# Patient Record
Sex: MALE | Race: WHITE | Employment: OTHER | URBAN - METROPOLITAN AREA
[De-identification: names, ages, dates, MRNs, and addresses within clinical notes are randomized per-mention and may not be internally consistent; named-entity substitution may affect disease eponyms.]

---

## 2017-03-11 RX ORDER — LISINOPRIL 40 MG/1
TABLET ORAL
Qty: 30 TAB | Refills: 5 | Status: SHIPPED | OUTPATIENT
Start: 2017-03-11 | End: 2017-05-10 | Stop reason: SDUPTHER

## 2017-04-02 RX ORDER — OMEPRAZOLE 20 MG/1
CAPSULE, DELAYED RELEASE ORAL
Qty: 30 CAP | Refills: 11 | Status: SHIPPED | OUTPATIENT
Start: 2017-04-02 | End: 2017-05-10 | Stop reason: SDUPTHER

## 2017-04-09 RX ORDER — ALBUTEROL SULFATE 90 UG/1
AEROSOL, METERED RESPIRATORY (INHALATION)
Qty: 8.5 INHALER | Refills: 3 | Status: SHIPPED | OUTPATIENT
Start: 2017-04-09 | End: 2017-05-22 | Stop reason: SDUPTHER

## 2017-04-19 ENCOUNTER — OFFICE VISIT (OUTPATIENT)
Dept: BEHAVIORAL/MENTAL HEALTH CLINIC | Age: 62
End: 2017-04-19

## 2017-04-19 VITALS
HEIGHT: 68 IN | HEART RATE: 109 BPM | SYSTOLIC BLOOD PRESSURE: 161 MMHG | DIASTOLIC BLOOD PRESSURE: 84 MMHG | WEIGHT: 212 LBS | BODY MASS INDEX: 32.13 KG/M2

## 2017-04-19 DIAGNOSIS — F41.9 ANXIETY DISORDER, UNSPECIFIED TYPE: ICD-10-CM

## 2017-04-19 DIAGNOSIS — F31.70 BIPOLAR DISORDER IN REMISSION (HCC): Primary | ICD-10-CM

## 2017-04-19 RX ORDER — DIVALPROEX SODIUM 250 MG/1
TABLET, DELAYED RELEASE ORAL
Qty: 60 TAB | Refills: 6 | Status: SHIPPED | OUTPATIENT
Start: 2017-04-19 | End: 2017-05-22 | Stop reason: SDUPTHER

## 2017-04-19 RX ORDER — SERTRALINE HYDROCHLORIDE 50 MG/1
TABLET, FILM COATED ORAL
Qty: 30 TAB | Refills: 6 | Status: SHIPPED | OUTPATIENT
Start: 2017-04-19 | End: 2017-05-22 | Stop reason: SDUPTHER

## 2017-04-19 NOTE — MR AVS SNAPSHOT
Visit Information Date & Time Provider Department Dept. Phone Encounter #  
 4/19/2017  4:00 PM Osei Crowley MD UlKatheryn Ortiz  [phone] [id number] Follow-up Instructions Return in about 6 months (around 10/19/2017). Your Appointments 5/10/2017  8:00 AM  
ROUTINE CARE with Rosario Israel MD  
[facility] Appt Note: f/u for asthma, b/p, and cholesterol [address] [facility] [address] [place] Drum [address] [address]  
[place] [address] [address] Upcoming Health Maintenance Date Due COLONOSCOPY 8/15/2017 Pneumococcal 19-64 Highest Risk (3 of 3 - PCV13) 11/10/2017 DTaP/Tdap/Td series (2 - Td) 2/8/2021 Allergies as of 4/19/2017  Review Complete On: 4/19/2017 By: Osei Crowley MD  
  
 Severity Noted Reaction Type Reactions Hydrochlorothiazide  04/19/2016    Other (comments)  
 hyponatremia Current Immunizations  Reviewed on 11/10/2016 Name Date Influenza Vaccine 10/10/2016, 9/26/2013 Influenza Vaccine (Quad) PF 10/12/2015, 10/13/2014 Influenza Vaccine Whole 9/26/2012 Pneumococcal Polysaccharide (PPSV-23) 5/18/2015 TDAP Vaccine 2/8/2011 Zoster Vaccine, Live 5/7/2013 Not reviewed this visit You Were Diagnosed With   
  
 Codes Comments Bipolar disorder in remission [facility])    -  Primary ICD-10-CM: F31.70 ICD-9-CM: 296.80 Anxiety disorder, unspecified type     ICD-10-CM: F41.9 ICD-9-CM: 300.00 Vitals BP Pulse Height(growth percentile) Weight(growth percentile) BMI Smoking Status 161/84 (!) 109 5' 8\" (1.727 m) 212 lb (96.2 kg) 32.23 kg/m2 Former Smoker Vitals History BMI and BSA Data Body Mass Index Body Surface Area  
 32.23 kg/m 2 2.15 m 2 Preferred Pharmacy Pharmacy Name Phone [address] [phone] Your Updated Medication List  
  
   
This list is accurate as of: 4/19/17  4:03 PM.  Always use your most recent med list.  
  
  
  
  
 acyclovir 200 mg capsule Commonly known as:  ZOVIRAX Take  by mouth every four (4) hours (while awake). amLODIPine 10 mg tablet Commonly known as:  Mosetta Hark TAKE ONE TABLET BY MOUTH ONE TIME DAILY  
  
 beclomethasone 80 mcg/actuation Aero Commonly known as:  QVAR Take 2 Puffs by inhalation two (2) times a day. divalproex  mg tablet Commonly known as:  DEPAKOTE  
TAKE ONE TABLET BY MOUTH TWICE DAILY  Indications: BIPOLAR DISORDER IN REMISSION  
  
 lisinopril 40 mg tablet Commonly known as:  PRINIVIL, ZESTRIL  
TAKE 1/2 TABLET BY MOUTH TWICE DAILY. loratadine 10 mg tablet Commonly known as:  CLARITIN  
TAKE ONE TABLET BY MOUTH ONE TIME DAILY  
  
 montelukast 10 mg tablet Commonly known as:  SINGULAIR Take 1 Tab by mouth daily. omeprazole 20 mg capsule Commonly known as:  PRILOSEC  
TAKE ONE CAPSULE BY MOUTH ONE TIME DAILY pravastatin 80 mg tablet Commonly known as:  PRAVACHOL  
TAKE ONE TABLET BY MOUTH NIGHTLY AT BEDTIME  
  
 PRED FORTE 1 % ophthalmic suspension Generic drug:  prednisoLONE acetate Administer 1 Drop to both eyes four (4) times daily. PROAIR HFA 90 mcg/actuation inhaler Generic drug:  albuterol INHALE TWO PUFFS BY MOUTH EVERY FOUR HOURS AS NEEDED FOR WHEEZING  
  
 sertraline 50 mg tablet Commonly known as:  ZOLOFT  
TAKE ONE TABLET BY MOUTH ONE TIME DAILY FOR ANXIETY WITH DEPRESSION  
  
 tamsulosin 0.4 mg capsule Commonly known as:  FLOMAX Take 0.4 mg by mouth daily. Prescriptions Sent to Pharmacy Refills  
 divalproex DR (DEPAKOTE) 250 mg tablet 6 Sig: TAKE ONE TABLET BY MOUTH TWICE DAILY  Indications: BIPOLAR DISORDER IN REMISSION Class: Normal  
 Pharmacy: [facility] [place] [address] Ph #: [phone] sertraline (ZOLOFT) 50 mg tablet 6 Sig: TAKE ONE TABLET BY MOUTH ONE TIME DAILY FOR ANXIETY WITH DEPRESSION Class: Normal  
 Pharmacy: [address] #: [phone] Follow-up Instructions Return in about 6 months (around 10/19/2017). Introducing [facility] SERVICES! Dear Valentín España: Thank you for requesting a [employer] account. Our records indicate that you already have an active [employer] account. You can access your account anytime at [email or web address] Did you know that you can access your hospital and ER discharge instructions at any time in [employer]? You can also review all of your test results from your hospital stay or ER visit. Additional Information If you have questions, please visit the Frequently Asked Questions section of the [employer] website at [email or web address] Remember, [employer] is NOT to be used for urgent needs. For medical emergencies, dial 911. Now available from your iPhone and Android! Please provide this summary of care documentation to your next provider. Your primary care clinician is listed as [address] If you have any questions after today's visit, please call [phone].

## 2017-04-20 NOTE — PROGRESS NOTES
Psychiatric Progress Note    Date: 4/19/2017  Account Number:  [id number]  Name: Baron Phipps    Length of psychotherapy session: 15 minutes     Total Patient Care Time Spent: 20 minutes : (Coordinated care:  counseling time with patient, individual psychotherapy with patient; discussions with family members and chart review). SUBJECTIVE:   Baron Phipps  is a 62 y.o.  male  patient presents for a therapy/psychopharmacological management appointment. Pt reports doing well, mood is stable on current meds,  taking meds without any problems or concerns. Patient denies SI/HI/SIB. No evidence of AH/VH or delusions.       Appetite:no change from normal   Sleep: no change     Response to Treatment: Positive   Side Effects: none      Supportive/Cognitive/Reality-Oriented psychotherapy provided in regards to psychosocial stressors:   pre-admission and current problems   Housing issues   Occupational issues   Academic issues   Legal issues   Medical issues   Interpersonal conflicts   Stress of hospitalization  Psychoeducation provided  Treatment plan reviewed with patient-including diagnosis and medications  Worked on issues of denial & effects of substance dependency/use      OBJECTIVE:                 Mental Status exam: WNL except for      Sensorium  oriented to time, place and person   Relations cooperative    Eye Contact    appropriate   Appearance:  age appropriate, casually dressed and within normal Limits   Motor Behavior:  within normal limits   Speech:  normal pitch and normal volume   Thought Process: within normal limits   Thought Content free of delusions and free of hallucinations   Suicidal ideations none   Homicidal ideations none   Mood:  stable   Affect:  stable   Memory recent  adequate   Memory remote:  adequate   Concentration:  adequate   Abstraction:  abstract   Insight:  good   Reliability good   Judgment:  good       Allergies   Allergen Reactions    Hydrochlorothiazide Other (comments) hyponatremia        Current Outpatient Prescriptions   Medication Sig Dispense Refill    divalproex DR (DEPAKOTE) 250 mg tablet TAKE ONE TABLET BY MOUTH TWICE DAILY  Indications: BIPOLAR DISORDER IN REMISSION 60 Tab 6    sertraline (ZOLOFT) 50 mg tablet TAKE ONE TABLET BY MOUTH ONE TIME DAILY FOR ANXIETY WITH DEPRESSION 30 Tab 6    PROAIR HFA 90 mcg/actuation inhaler INHALE TWO PUFFS BY MOUTH EVERY FOUR HOURS AS NEEDED FOR WHEEZING 8.5 Inhaler 3    omeprazole (PRILOSEC) 20 mg capsule TAKE ONE CAPSULE BY MOUTH ONE TIME DAILY 30 Cap 11    lisinopril (PRINIVIL, ZESTRIL) 40 mg tablet TAKE 1/2 TABLET BY MOUTH TWICE DAILY. 30 Tab 5    amLODIPine (NORVASC) 10 mg tablet TAKE ONE TABLET BY MOUTH ONE TIME DAILY 30 Tab 5    pravastatin (PRAVACHOL) 80 mg tablet TAKE ONE TABLET BY MOUTH NIGHTLY AT BEDTIME 30 Tab 5    loratadine (CLARITIN) 10 mg tablet TAKE ONE TABLET BY MOUTH ONE TIME DAILY 30 Tab 11    montelukast (SINGULAIR) 10 mg tablet Take 1 Tab by mouth daily. 30 Tab 5    beclomethasone (QVAR) 80 mcg/actuation inhaler Take 2 Puffs by inhalation two (2) times a day. 8.7 g 5    acyclovir (ZOVIRAX) 200 mg capsule Take  by mouth every four (4) hours (while awake).  prednisoLONE acetate (PRED FORTE) 1 % ophthalmic suspension Administer 1 Drop to both eyes four (4) times daily.  tamsulosin (FLOMAX) 0.4 mg capsule Take 0.4 mg by mouth daily. Medication Changes/Adjustments:   Medications Discontinued During This Encounter   Medication Reason    divalproex DR (DEPAKOTE) 250 mg tablet Reorder    sertraline (ZOLOFT) 50 mg tablet Reorder          ASSESSMENT:  Christian Graff  is a 62 y.o.  male patient presented for his f/u appointment. Pt is stable on current meds. Diagnoses:   Axis I: Bipolar disorder, in remission  Anxiety disorder   Axis II: Deferred   Axis III: HTN, GERD, Borderline DM, left eye cataract   Axis IV:Mild   Axis V: 70-75    RECOMMENDATIONS/PLAN: Continue with the current meds  1. Medications:   Orders Placed This Encounter    divalproex DR (DEPAKOTE) 250 mg tablet    sertraline (ZOLOFT) 50 mg tablet      2. Follow-up Disposition:  Return in about 6 months (around 10/19/2017).

## 2017-05-10 ENCOUNTER — OFFICE VISIT (OUTPATIENT)
Dept: INTERNAL MEDICINE CLINIC | Age: 62
End: 2017-05-10

## 2017-05-10 VITALS
HEIGHT: 68 IN | RESPIRATION RATE: 15 BRPM | HEART RATE: 85 BPM | WEIGHT: 208 LBS | SYSTOLIC BLOOD PRESSURE: 136 MMHG | BODY MASS INDEX: 31.52 KG/M2 | OXYGEN SATURATION: 94 % | TEMPERATURE: 97.9 F | DIASTOLIC BLOOD PRESSURE: 80 MMHG

## 2017-05-10 DIAGNOSIS — E87.5 HYPERKALEMIA: ICD-10-CM

## 2017-05-10 DIAGNOSIS — I10 ESSENTIAL HYPERTENSION WITH GOAL BLOOD PRESSURE LESS THAN 140/90: ICD-10-CM

## 2017-05-10 DIAGNOSIS — F31.32 BIPOLAR DISORDER, CURRENT EPISODE DEPRESSED, MODERATE (HCC): ICD-10-CM

## 2017-05-10 DIAGNOSIS — D64.9 ANEMIA, UNSPECIFIED TYPE: ICD-10-CM

## 2017-05-10 DIAGNOSIS — R73.02 IGT (IMPAIRED GLUCOSE TOLERANCE): ICD-10-CM

## 2017-05-10 DIAGNOSIS — J45.909 UNCOMPLICATED ASTHMA, UNSPECIFIED ASTHMA SEVERITY: Primary | ICD-10-CM

## 2017-05-10 DIAGNOSIS — F41.8 DEPRESSION WITH ANXIETY: ICD-10-CM

## 2017-05-10 DIAGNOSIS — N40.0 BENIGN PROSTATIC HYPERPLASIA, PRESENCE OF LOWER URINARY TRACT SYMPTOMS UNSPECIFIED, UNSPECIFIED MORPHOLOGY: ICD-10-CM

## 2017-05-10 DIAGNOSIS — K21.9 GASTROESOPHAGEAL REFLUX DISEASE WITHOUT ESOPHAGITIS: ICD-10-CM

## 2017-05-10 DIAGNOSIS — T14.8XXA BRUISING: ICD-10-CM

## 2017-05-10 DIAGNOSIS — I10 ESSENTIAL HYPERTENSION: ICD-10-CM

## 2017-05-10 DIAGNOSIS — E78.5 DYSLIPIDEMIA: ICD-10-CM

## 2017-05-10 DIAGNOSIS — K56.699 STRICTURE OF COLON (HCC): ICD-10-CM

## 2017-05-10 LAB — APTT PPP: 30 SEC (ref 24–33)

## 2017-05-10 RX ORDER — TAMSULOSIN HYDROCHLORIDE 0.4 MG/1
0.4 CAPSULE ORAL DAILY
Qty: 90 CAP | Refills: 3 | Status: SHIPPED | OUTPATIENT
Start: 2017-05-10

## 2017-05-10 RX ORDER — AMLODIPINE BESYLATE 10 MG/1
TABLET ORAL
Qty: 90 TAB | Refills: 3 | Status: SHIPPED | OUTPATIENT
Start: 2017-05-10 | End: 2018-05-11 | Stop reason: SDUPTHER

## 2017-05-10 RX ORDER — PRAVASTATIN SODIUM 80 MG/1
TABLET ORAL
Qty: 90 TAB | Refills: 3 | Status: SHIPPED | OUTPATIENT
Start: 2017-05-10 | End: 2018-05-11 | Stop reason: SDUPTHER

## 2017-05-10 RX ORDER — LISINOPRIL 40 MG/1
TABLET ORAL
Qty: 90 TAB | Refills: 3 | Status: SHIPPED | OUTPATIENT
Start: 2017-05-10 | End: 2018-05-11 | Stop reason: SDUPTHER

## 2017-05-10 RX ORDER — OMEPRAZOLE 20 MG/1
CAPSULE, DELAYED RELEASE ORAL
Qty: 90 CAP | Refills: 3 | Status: SHIPPED | OUTPATIENT
Start: 2017-05-10 | End: 2018-05-11 | Stop reason: SDUPTHER

## 2017-05-10 NOTE — PROGRESS NOTES
HISTORY OF PRESENT ILLNESS  Harika Alberts is a 62 y.o. male. HPI  Presents for f/u asthma, HTN, lipids    Pt on claritin + inhaled meds due to pollen counts  Fair to good control. Forearm bruising - increased over the past few years  Many times he does not remember the incident  No bleeding  Evolves and resolves over several days   Pt acknowledges known frequent mild trauma to arms due to his activities    Due for colon screening - due to colon stricture, needs to consider virtual colonoscopy    Past medical, Social, and Family history reviewed  Medications reviewed and updated. ROS  Complete ROS reviewed and negative or stable except as noted in HPI. Physical Exam   Constitutional: He is oriented to person, place, and time. He appears well-nourished. No distress. HENT:   Head: Normocephalic and atraumatic. Mouth/Throat: Oropharynx is clear and moist.   Eyes: EOM are normal. Pupils are equal, round, and reactive to light. No scleral icterus. Neck: Normal range of motion. Neck supple. No JVD present. Cardiovascular: Normal rate, regular rhythm and normal heart sounds. Exam reveals no gallop and no friction rub. No murmur heard. Pulmonary/Chest: Effort normal and breath sounds normal. No respiratory distress. He has no wheezes. He has no rales. Prolonged exp phase   Abdominal: Soft. He exhibits no distension. There is no tenderness. Musculoskeletal: Normal range of motion. He exhibits no edema. Lymphadenopathy:     He has no cervical adenopathy. Neurological: He is alert and oriented to person, place, and time. He exhibits normal muscle tone. Skin: Skin is warm. No rash noted. Psychiatric: He has a normal mood and affect. Nursing note and vitals reviewed. ASSESSMENT and PLAN    ICD-10-CM ICD-9-CM    1. Uncomplicated asthma, unspecified asthma severity J45.909 493.90    2. Stricture of colon (HCC) K56.69 560.9 REFERRAL TO GASTROENTEROLOGY   3.  Depression with anxiety F41.8 300.4 4. Bipolar disorder, current episode depressed, moderate ([facility] [address] F31.32 296.52    5. Essential hypertension I10 401.9    6. Gastroesophageal reflux disease without esophagitis K21.9 530.81    7. Anemia, unspecified type D64.9 285.9 CBC WITH AUTOMATED DIFF   8. IGT (impaired glucose tolerance) R73.02 790.22 HEMOGLOBIN A1C WITH EAG   9. Dyslipidemia E78.5 272.4 LIPID PANEL      METABOLIC PANEL, COMPREHENSIVE   10. Benign prostatic hyperplasia, presence of lower urinary tract symptoms unspecified, unspecified morphology N40.0 600.00    11. Bruising T14.8 924.9 CBC WITH AUTOMATED DIFF      SED RATE (ESR)      C REACTIVE PROTEIN, QT      PROTHROMBIN TIME + INR      PTT   12. Essential hypertension with goal blood pressure less than 140/90 I10 401.9 amLODIPine (NORVASC) 10 mg tablet   13. Hyperkalemia N83.8 706.1 METABOLIC PANEL, BASIC     Follow-up Disposition:  Return in about 6 months (around 11/10/2017), or if symptoms worsen or fail to improve, for asthma, blood pressure.   results and schedule of future studies reviewed with patient  reviewed diet, exercise and weight    cardiovascular risk and specific lipid/LDL goals reviewed  reviewed medications and side effects in detail   Ref to GI  Continue current medications

## 2017-05-10 NOTE — PATIENT INSTRUCTIONS
Learning About Living Dayna Patel  What is a living will? A living will is a legal form you use to write down the kind of care you want at the end of your life. It is used by the health professionals who will treat you if you aren't able to decide for yourself. If you put your wishes in writing, your loved ones and others will know what kind of care you want. They won't need to guess. This can ease your mind and be helpful to others. A living will is not the same as an estate or property will. An estate will explains what you want to happen with your money and property after you die. Is a living will a legal document? A living will is a legal document. Each state has its own laws about living [place]. If you move to another state, make sure that your living will is legal in the state where you now live. Or you might use a universal form that has been approved by many states. This kind of form can sometimes be completed and stored online. Your electronic copy will then be available wherever you have a connection to the Internet. In most cases, doctors will respect your wishes even if you have a form from a different state. · You don't need an  to complete a living will. But legal advice can be helpful if your state's laws are unclear, your health history is complicated, or your family can't agree on what should be in your living will. · You can change your living will at any time. Some people find that their wishes about end-of-life care change as their health changes. · In addition to making a living will, think about completing a medical power of  form. This form lets you name the person you want to make end-of-life treatment decisions for you (your \"health care agent\") if you're not able to. Many hospitals and nursing homes will give you the forms you need to complete a living will and a medical power of .   · Your living will is used only if you can't make or communicate decisions for yourself anymore. If you become able to make decisions again, you can accept or refuse any treatment, no matter what you wrote in your living will. · Your state may offer an online registry. This is a place where you can store your living will online so the doctors and nurses who need to treat you can find it right away. What should you think about when creating a living will? Talk about your end-of-life wishes with your family members and your doctor. Let them know what you want. That way the people making decisions for you won't be surprised by your choices. Think about these questions as you make your living will:  · Do you know enough about life support methods that might be used? If not, talk to your doctor so you know what might be done if you can't breathe on your own, your heart stops, or you're unable to swallow. · What things would you still want to be able to do after you receive life-support methods? Would you want to be able to walk? To speak? To eat on your own? To live without the help of machines? · If you have a choice, where do you want to be cared for? In your home? At a hospital or nursing home? · Do you want certain [employer] practices performed if you become very ill? · If you have a choice at the end of your life, where would you prefer to die? At home? In a hospital or nursing home? Somewhere else? · Would you prefer to be buried or cremated? · Do you want your organs to be donated after you die? What should you do with your living will? · Make sure that your family members and your health care agent have copies of your living will. · Give your doctor a copy of your living will to keep in your medical record. If you have more than one doctor, make sure that each one has a copy. · You may want to put a copy of your living will where it can be easily found. Where can you learn more? Go to http://su-max.info/.   Enter D195 in the search box to learn more about \"Learning About Living Geeta Hernandez. \"  Current as of: February 24, 2016  Content Version: 11.2  © 7382-9967 [employer]. Care instructions adapted under license by [employer] (which disclaims liability or warranty for this information). If you have questions about a medical condition or this instruction, always ask your healthcare professional. [address] any warranty or liability for your use of this information.

## 2017-05-10 NOTE — PROGRESS NOTES
Room 13    Chief Complaint   Patient presents with    Asthma    Blood Pressure Check    Cholesterol Problem   Patient has stopped taking prednisolone drops. Taking Singular during ragweed season only. 1. Have you been to the ER, urgent care clinic since your last visit? Hospitalized since your last visit? No    2. Have you seen or consulted any other health care providers outside of the [address] [place] [address] since your last visit? Include any pap smears or colon screening. No       Health Maintenance Due   Topic Date Due    COLONOSCOPY  08/15/2017   Patient aware colonoscopy is due, states needs to switch gastro. Doctor's.     Information provided regarding living will with AVS.

## 2017-05-10 NOTE — MR AVS SNAPSHOT
Visit Information Date & Time Provider Department Dept. Phone Encounter #  
 5/10/2017  8:00 AM Dimitris Barlow MD [facility] [phone] [id number] Follow-up Instructions Return in about 6 months (around 11/10/2017), or if symptoms worsen or fail to improve, for asthma, blood pressure. Your Appointments 10/19/2017  4:00 PM  
ESTABLISHED PATIENT with Darrick Cranker, MD [address] [place] [address] Appt Note: fu  
 5855 [facility] [address] [place] [address]  
[phone] [address] [place] [address] Upcoming Health Maintenance Date Due COLONOSCOPY 8/15/2017 INFLUENZA AGE 9 TO ADULT 8/1/2017 Pneumococcal 19-64 Highest Risk (3 of 3 - PCV13) 11/10/2017 DTaP/Tdap/Td series (2 - Td) 2/8/2021 Allergies as of 5/10/2017  Review Complete On: 5/10/2017 By: Dimitris Barlow MD  
  
 Severity Noted Reaction Type Reactions Hydrochlorothiazide  04/19/2016    Other (comments)  
 hyponatremia Current Immunizations  Reviewed on 11/10/2016 Name Date Influenza Vaccine 10/10/2016, 9/26/2013 Influenza Vaccine (Quad) PF 10/12/2015, 10/13/2014 Influenza Vaccine Whole 9/26/2012 Pneumococcal Polysaccharide (PPSV-23) 5/18/2015 TDAP Vaccine 2/8/2011 Zoster Vaccine, Live 5/7/2013 Not reviewed this visit You Were Diagnosed With   
  
 Codes Comments Stricture of colon ([facility].)    -  Primary ICD-10-CM: K56.69 
ICD-9-CM: 560.9 Depression with anxiety     ICD-10-CM: F41.8 ICD-9-CM: 300.4 Uncomplicated asthma, unspecified asthma severity     ICD-10-CM: J45.909 ICD-9-CM: 493.90 Bipolar disorder, current episode depressed, moderate ([facility]     ICD-10-CM: F31.32 
ICD-9-CM: 296.52 Essential hypertension     ICD-10-CM: I10 
ICD-9-CM: 401.9 Gastroesophageal reflux disease without esophagitis     ICD-10-CM: K21.9 ICD-9-CM: 530.81   
 Anemia, unspecified type     ICD-10-CM: D64.9 ICD-9-CM: 285.9 IGT (impaired glucose tolerance)     ICD-10-CM: R73.02 
ICD-9-CM: 790.22 Dyslipidemia     ICD-10-CM: E78.5 ICD-9-CM: 272.4 Benign prostatic hyperplasia, presence of lower urinary tract symptoms unspecified, unspecified morphology     ICD-10-CM: N40.0 ICD-9-CM: 600.00 Bruising     ICD-10-CM: T14.8 ICD-9-CM: 924.9 Essential hypertension with goal blood pressure less than 140/90     ICD-10-CM: I10 
ICD-9-CM: 401.9 Vitals BP Pulse Temp Resp Height(growth percentile) Weight(growth percentile) 136/80 (BP 1 Location: Left arm, BP Patient Position: Sitting) 85 97.9 °F (36.6 °C) (Oral) 15 5' 8\" (1.727 m) 208 lb (94.3 kg) SpO2 BMI Smoking Status 94% 31.63 kg/m2 Former Smoker BMI and BSA Data Body Mass Index Body Surface Area  
 31.63 kg/m 2 2.13 m 2 Preferred Pharmacy Pharmacy Name Phone [address], [facility] [phone] Your Updated Medication List  
  
   
This list is accurate as of: 5/10/17  8:48 AM.  Always use your most recent med list.  
  
  
  
  
 acyclovir 200 mg capsule Commonly known as:  ZOVIRAX Take  by mouth every four (4) hours (while awake). amLODIPine 10 mg tablet Commonly known as:  Briana Punter TAKE ONE TABLET BY MOUTH ONE TIME DAILY  
  
 beclomethasone 80 mcg/actuation Aero Commonly known as:  QVAR Take 2 Puffs by inhalation two (2) times a day. divalproex  mg tablet Commonly known as:  DEPAKOTE  
TAKE ONE TABLET BY MOUTH TWICE DAILY  Indications: BIPOLAR DISORDER IN REMISSION  
  
 lisinopril 40 mg tablet Commonly known as:  PRINIVIL, ZESTRIL  
TAKE 1/2 TABLET BY MOUTH TWICE DAILY. loratadine 10 mg tablet Commonly known as:  CLARITIN  
TAKE ONE TABLET BY MOUTH ONE TIME DAILY  
  
 montelukast 10 mg tablet Commonly known as:  SINGULAIR Take 1 Tab by mouth daily. omeprazole 20 mg capsule Commonly known as:  PRILOSEC  
TAKE ONE CAPSULE BY MOUTH ONE TIME DAILY pravastatin 80 mg tablet Commonly known as:  PRAVACHOL  
TAKE ONE TABLET BY MOUTH NIGHTLY AT BEDTIME  
  
 PROAIR HFA 90 mcg/actuation inhaler Generic drug:  albuterol INHALE TWO PUFFS BY MOUTH EVERY FOUR HOURS AS NEEDED FOR WHEEZING  
  
 sertraline 50 mg tablet Commonly known as:  ZOLOFT  
TAKE ONE TABLET BY MOUTH ONE TIME DAILY FOR ANXIETY WITH DEPRESSION  
  
 tamsulosin 0.4 mg capsule Commonly known as:  FLOMAX Take 1 Cap by mouth daily. Prescriptions Sent to Pharmacy Refills  
 pravastatin (PRAVACHOL) 80 mg tablet 3 Sig: TAKE ONE TABLET BY MOUTH NIGHTLY AT BEDTIME Class: Normal  
 Pharmacy: [address], [address] Ph #: [phone]  
 lisinopril (PRINIVIL, ZESTRIL) 40 mg tablet 3 Sig: TAKE 1/2 TABLET BY MOUTH TWICE DAILY. Class: Normal  
 Pharmacy: [address], [address] Ph #: [phone]  
 omeprazole (PRILOSEC) 20 mg capsule 3 Sig: TAKE ONE CAPSULE BY MOUTH ONE TIME DAILY Class: Normal  
 Pharmacy: [address], [address] Ph #: [phone]  
 amLODIPine (NORVASC) 10 mg tablet 3 Sig: TAKE ONE TABLET BY MOUTH ONE TIME DAILY Class: Normal  
 Pharmacy: [address], [address] Ph #: [phone]  
 tamsulosin (FLOMAX) 0.4 mg capsule 3 Sig: Take 1 Cap by mouth daily. Class: Normal  
 Pharmacy: [address], [address] Ph #: [phone] Route: Oral  
  
We Performed the Following C REACTIVE PROTEIN, QT [94010 CPT(R)] CBC WITH AUTOMATED DIFF [22089 CPT(R)] HEMOGLOBIN A1C WITH EAG [08385 CPT(R)] LIPID PANEL [14886 CPT(R)] METABOLIC PANEL, COMPREHENSIVE [42244 CPT(R)] PROTHROMBIN TIME + INR [00723 CPT(R)] PTT [id number] CPT(R)] REFERRAL TO GASTROENTEROLOGY [UWN68 Custom] Comments:  
 Colon imaging due to sigmoid stricture SED RATE (ESR) [id number] CPT(R)] Follow-up Instructions Return in about 6 months (around 11/10/2017), or if symptoms worsen or fail to improve, for asthma, blood pressure. Referral Information Referral ID Referred By Referred To  
  
 [id number] [place], [address]   
   [address] 706 [facility], [address] Visits Status Start Date End Date 1 New Request 5/10/17 5/10/18 If your referral has a status of pending review or denied, additional information will be sent to support the outcome of this decision. Patient Instructions Eula James 1721 What is a living will? A living will is a legal form you use to write down the kind of care you want at the end of your life. It is used by the health professionals who will treat you if you aren't able to decide for yourself. If you put your wishes in writing, your loved ones and others will know what kind of care you want. They won't need to guess. This can ease your mind and be helpful to others. A living will is not the same as an estate or property will. An estate will explains what you want to happen with your money and property after you die. Is a living will a legal document? A living will is a legal document. Each state has its own laws about living [place]. If you move to another state, make sure that your living will is legal in the state where you now live. Or you might use a universal form that has been approved by many states. This kind of form can sometimes be completed and stored online. Your electronic copy will then be available wherever you have a connection to the Internet. In most cases, doctors will respect your wishes even if you have a form from a different state. · You don't need an  to complete a living will. But legal advice can be helpful if your state's laws are unclear, your health history is complicated, or your family can't agree on what should be in your living will. · You can change your living will at any time. Some people find that their wishes about end-of-life care change as their health changes. · In addition to making a living will, think about completing a medical power of  form. This form lets you name the person you want to make end-of-life treatment decisions for you (your \"health care agent\") if you're not able to. Many hospitals and nursing homes will give you the forms you need to complete a living will and a medical power of . · Your living will is used only if you can't make or communicate decisions for yourself anymore. If you become able to make decisions again, you can accept or refuse any treatment, no matter what you wrote in your living will. · Your state may offer an online registry. This is a place where you can store your living will online so the doctors and nurses who need to treat you can find it right away. What should you think about when creating a living will? Talk about your end-of-life wishes with your family members and your doctor. Let them know what you want. That way the people making decisions for you won't be surprised by your choices. Think about these questions as you make your living will: · Do you know enough about life support methods that might be used? If not, talk to your doctor so you know what might be done if you can't breathe on your own, your heart stops, or you're unable to swallow. · What things would you still want to be able to do after you receive life-support methods? Would you want to be able to walk? To speak? To eat on your own? To live without the help of machines? · If you have a choice, where do you want to be cared for? In your home? At a hospital or nursing home? · Do you want certain Roman Catholic practices performed if you become very ill? · If you have a choice at the end of your life, where would you prefer to die? At home? In a hospital or nursing home? Somewhere else? · Would you prefer to be buried or cremated? · Do you want your organs to be donated after you die? What should you do with your living will? · Make sure that your family members and your health care agent have copies of your living will. · Give your doctor a copy of your living will to keep in your medical record. If you have more than one doctor, make sure that each one has a copy. · You may want to put a copy of your living will where it can be easily found. Where can you learn more? Go to http://su-max.info/. Enter K085 in the search box to learn more about \"Learning About Living Perroconcepcion. \" Current as of: February 24, 2016 Content Version: 11.2 © 4731-9637 [employer]. Care instructions adapted under license by [employer] (which disclaims liability or warranty for this information). If you have questions about a medical condition or this instruction, always ask your healthcare professional. [address] any warranty or liability for your use of this information. Introducing [facility] & HEALTH SERVICES! Dear Tamy Franks: Thank you for requesting a [employer] account. Our records indicate that you already have an active [employer] account. You can access your account anytime at [email or web address] Did you know that you can access your hospital and ER discharge instructions at any time in [employer]? You can also review all of your test results from your hospital stay or ER visit. Additional Information If you have questions, please visit the Frequently Asked Questions section of the [employer] website at [email or web address] Remember, [employer] is NOT to be used for urgent needs.  For medical emergencies, dial 911. Now available from your iPhone and Android! Please provide this summary of care documentation to your next provider. Your primary care clinician is listed as [address] If you have any questions after today's visit, please call [phone].

## 2017-05-11 LAB
ALBUMIN SERPL-MCNC: 4.6 G/DL (ref 3.6–4.8)
ALBUMIN/GLOB SERPL: 1.6 {RATIO} (ref 1.2–2.2)
ALP SERPL-CCNC: 105 IU/L (ref 39–117)
ALT SERPL-CCNC: 10 IU/L (ref 0–44)
AST SERPL-CCNC: 12 IU/L (ref 0–40)
BASOPHILS # BLD AUTO: 0 X10E3/UL (ref 0–0.2)
BASOPHILS NFR BLD AUTO: 0 %
BILIRUB SERPL-MCNC: 0.3 MG/DL (ref 0–1.2)
BUN SERPL-MCNC: 21 MG/DL (ref 8–27)
BUN/CREAT SERPL: 23 (ref 10–24)
CALCIUM SERPL-MCNC: 9.7 MG/DL (ref 8.6–10.2)
CHLORIDE SERPL-SCNC: 101 MMOL/L (ref 96–106)
CHOLEST SERPL-MCNC: 175 MG/DL (ref 100–199)
CO2 SERPL-SCNC: 25 MMOL/L (ref 18–29)
CREAT SERPL-MCNC: 0.92 MG/DL (ref 0.76–1.27)
CRP SERPL-MCNC: 9.2 MG/L (ref 0–4.9)
EOSINOPHIL # BLD AUTO: 0.2 X10E3/UL (ref 0–0.4)
EOSINOPHIL NFR BLD AUTO: 2 %
ERYTHROCYTE [DISTWIDTH] IN BLOOD BY AUTOMATED COUNT: 13.2 % (ref 12.3–15.4)
ERYTHROCYTE [SEDIMENTATION RATE] IN BLOOD BY WESTERGREN METHOD: 2 MM/HR (ref 0–30)
EST. AVERAGE GLUCOSE BLD GHB EST-MCNC: 123 MG/DL
GLOBULIN SER CALC-MCNC: 2.9 G/DL (ref 1.5–4.5)
GLUCOSE SERPL-MCNC: 107 MG/DL (ref 65–99)
HBA1C MFR BLD: 5.9 % (ref 4.8–5.6)
HCT VFR BLD AUTO: 41.8 % (ref 37.5–51)
HDLC SERPL-MCNC: 37 MG/DL
HGB BLD-MCNC: 13.8 G/DL (ref 12.6–17.7)
IMM GRANULOCYTES # BLD: 0 X10E3/UL (ref 0–0.1)
IMM GRANULOCYTES NFR BLD: 0 %
INR PPP: 0.9 (ref 0.8–1.2)
LDLC SERPL CALC-MCNC: 110 MG/DL (ref 0–99)
LYMPHOCYTES # BLD AUTO: 2.1 X10E3/UL (ref 0.7–3.1)
LYMPHOCYTES NFR BLD AUTO: 23 %
MCH RBC QN AUTO: 31.6 PG (ref 26.6–33)
MCHC RBC AUTO-ENTMCNC: 33 G/DL (ref 31.5–35.7)
MCV RBC AUTO: 96 FL (ref 79–97)
MONOCYTES # BLD AUTO: 0.7 X10E3/UL (ref 0.1–0.9)
MONOCYTES NFR BLD AUTO: 8 %
NEUTROPHILS # BLD AUTO: 6.1 X10E3/UL (ref 1.4–7)
NEUTROPHILS NFR BLD AUTO: 67 %
PLATELET # BLD AUTO: 267 X10E3/UL (ref 150–379)
POTASSIUM SERPL-SCNC: 5.5 MMOL/L (ref 3.5–5.2)
PROT SERPL-MCNC: 7.5 G/DL (ref 6–8.5)
PROTHROMBIN TIME: 9.7 SEC (ref 9.1–12)
RBC # BLD AUTO: 4.37 X10E6/UL (ref 4.14–5.8)
SODIUM SERPL-SCNC: 143 MMOL/L (ref 134–144)
TRIGL SERPL-MCNC: 139 MG/DL (ref 0–149)
VLDLC SERPL CALC-MCNC: 28 MG/DL (ref 5–40)
WBC # BLD AUTO: 9.1 X10E3/UL (ref 3.4–10.8)

## 2017-05-22 RX ORDER — ALBUTEROL SULFATE 90 UG/1
AEROSOL, METERED RESPIRATORY (INHALATION)
Qty: 3 INHALER | Refills: 3 | Status: SHIPPED | OUTPATIENT
Start: 2017-05-22 | End: 2018-03-14 | Stop reason: SDUPTHER

## 2017-05-22 RX ORDER — MONTELUKAST SODIUM 10 MG/1
10 TABLET ORAL DAILY
Qty: 90 TAB | Refills: 3 | Status: SHIPPED | OUTPATIENT
Start: 2017-05-22 | End: 2018-05-11 | Stop reason: SDUPTHER

## 2017-05-22 RX ORDER — SERTRALINE HYDROCHLORIDE 50 MG/1
TABLET, FILM COATED ORAL
Qty: 90 TAB | Refills: 0 | Status: SHIPPED | OUTPATIENT
Start: 2017-05-22 | End: 2017-09-25 | Stop reason: SDUPTHER

## 2017-05-22 RX ORDER — DIVALPROEX SODIUM 250 MG/1
TABLET, DELAYED RELEASE ORAL
Qty: 180 TAB | Refills: 0 | Status: SHIPPED | OUTPATIENT
Start: 2017-05-22 | End: 2017-09-25 | Stop reason: SDUPTHER

## 2017-07-18 RX ORDER — LORATADINE 10 MG/1
TABLET ORAL
Qty: 30 TAB | Refills: 5 | Status: SHIPPED | OUTPATIENT
Start: 2017-07-18 | End: 2017-09-13 | Stop reason: SDUPTHER

## 2017-10-19 ENCOUNTER — OFFICE VISIT (OUTPATIENT)
Dept: BEHAVIORAL/MENTAL HEALTH CLINIC | Age: 62
End: 2017-10-19

## 2017-10-19 VITALS
HEART RATE: 116 BPM | BODY MASS INDEX: 31.52 KG/M2 | DIASTOLIC BLOOD PRESSURE: 68 MMHG | SYSTOLIC BLOOD PRESSURE: 140 MMHG | HEIGHT: 68 IN | WEIGHT: 208 LBS

## 2017-10-19 DIAGNOSIS — F41.9 ANXIETY DISORDER, UNSPECIFIED TYPE: ICD-10-CM

## 2017-10-19 DIAGNOSIS — F31.70 BIPOLAR DISORDER IN REMISSION (HCC): Primary | ICD-10-CM

## 2017-10-19 NOTE — MR AVS SNAPSHOT
Visit Information Date & Time Provider Department Dept. Phone Encounter #  
 10/19/2017  4:00 PM MD Rakesh Sandoval 5 [phone] [id number] Follow-up Instructions Return in about 6 months (around 4/19/2018). Your Appointments 10/19/2017  4:00 PM  
ESTABLISHED PATIENT with MD Rakesh Sandoval 5 [facility]) Appt Note:   
 5[address] [facility] [address] [place] [address]  
[phone] 75 [facility] [address]  
  
    
 11/13/2017  8:00 AM  
ROUTINE CARE with Selena Manzo MD  
[facility]) Appt Note: asthma, and blood pressure [address] [facility] [address] [facility] [address]  
220 [facility] [address]  
  
    
 4/19/2018  3:45 PM  
ESTABLISHED PATIENT with Lorne Anthony MD  
[facility]t Note:   
 5[address] [facility] [address] [place] [address]  
[phone] Upcoming Health Maintenance Date Due INFLUENZA AGE 9 TO ADULT 8/1/2017 COLONOSCOPY 8/15/2017 Pneumococcal 19-64 Highest Risk (3 of 3 - PCV13) 11/10/2017 DTaP/Tdap/Td series (2 - Td) 2/8/2021 Allergies as of 10/19/2017  Review Complete On: 10/19/2017 By: Lorne Anthony MD  
  
 Severity Noted Reaction Type Reactions Hydrochlorothiazide  04/19/2016    Other (comments)  
 hyponatremia Current Immunizations  Reviewed on 11/10/2016 Name Date Influenza Vaccine 10/10/2016, 9/26/2013 Influenza Vaccine (Quad) PF 10/12/2015, 10/13/2014 Influenza Vaccine Whole 9/26/2012 Pneumococcal Polysaccharide (PPSV-23) 5/18/2015 TDAP Vaccine 2/8/2011 Zoster Vaccine, Live 5/7/2013 Not reviewed this visit You Were Diagnosed With   
  
 Codes Comments Bipolar disorder in remission [facility])    -  Primary ICD-10-CM: F31.70 ICD-9-CM: 296.80 Anxiety disorder, unspecified type     ICD-10-CM: F41.9 ICD-9-CM: 300.00 Vitals BP Pulse Height(growth percentile) Weight(growth percentile) BMI Smoking Status 140/68 (!) 116 5' 8\" (1.727 m) 208 lb (94.3 kg) 31.63 kg/m2 Former Smoker Vitals History BMI and BSA Data Body Mass Index Body Surface Area  
 31.63 kg/m 2 2.13 m 2 Preferred Pharmacy Pharmacy Name Phone [address] [facility] [phone] Your Updated Medication List  
  
   
This list is accurate as of: 10/19/17  3:46 PM.  Always use your most recent med list.  
  
  
  
  
 acyclovir 200 mg capsule Commonly known as:  ZOVIRAX Take  by mouth every four (4) hours (while awake). albuterol 90 mcg/actuation inhaler Commonly known as:  PROAIR HFA INHALE TWO PUFFS BY MOUTH EVERY FOUR HOURS AS NEEDED FOR WHEEZING  
  
 amLODIPine 10 mg tablet Commonly known as:  Hughcullen Cosme TAKE ONE TABLET BY MOUTH ONE TIME DAILY  
  
 beclomethasone 80 mcg/actuation Aero Commonly known as:  QVAR Take 2 Puffs by inhalation two (2) times a day. divalproex  mg tablet Commonly known as:  DEPAKOTE  
TAKE 1 TABLET TWICE A DAY (BIPOLAR DISORDER IN REMISSION)  
  
 lisinopril 40 mg tablet Commonly known as:  PRINIVIL, ZESTRIL  
TAKE 1/2 TABLET BY MOUTH TWICE DAILY. loratadine 10 mg tablet Commonly known as:  CLARITIN  
TAKE ONE TABLET BY MOUTH ONE TIME DAILY  
  
 montelukast 10 mg tablet Commonly known as:  SINGULAIR Take 1 Tab by mouth daily. omeprazole 20 mg capsule Commonly known as:  PRILOSEC  
TAKE ONE CAPSULE BY MOUTH ONE TIME DAILY pravastatin 80 mg tablet Commonly known as:  PRAVACHOL  
TAKE ONE TABLET BY MOUTH NIGHTLY AT BEDTIME  
  
 sertraline 50 mg tablet Commonly known as:  ZOLOFT  
TAKE 1 TABLET DAILY FOR ANXIETY WITH DEPRESSION  
  
 tamsulosin 0.4 mg capsule Commonly known as:  FLOMAX Take 1 Cap by mouth daily. Follow-up Instructions Return in about 6 months (around 4/19/2018). Introducing [facility] & HEALTH SERVICES! Dear Mini Hinojosa: Thank you for requesting a Re[employer] account. Our records indicate that you already have an active Repp[employer] account. You can access your account anytime at [email or web address] Did you know that you can access your hospital and ER discharge instructions at any time in Reppify? You can also review all of your test results from your hospital stay or ER visit. Additional Information If you have questions, please visit the Frequently Asked Questions section of the Reppify website at [email or web address] Remember, Reppify is NOT to be used for urgent needs. For medical emergencies, dial 911. Now available from your iPhone and Android! Please provide this summary of care documentation to your next provider. Your primary care clinician is listed as [address] If you have any questions after today's visit, please call [phone].

## 2017-10-20 NOTE — PROGRESS NOTES
Psychiatric Progress Note    Date: 10/19/2017  Account Number:  [id number]  Name: Tiffanie Kohler    Length of psychotherapy session: 15 minutes     Total Patient Care Time Spent: 20 minutes : (Coordinated care:  counseling time with patient, individual psychotherapy with patient; discussions with family members and chart review). SUBJECTIVE:   Tiffanie Kohler  is a 65 y.o.  male  patient presents for a therapy/psychopharmacological management appointment. Pt reports doing well, mood is stable on current meds,  taking meds without any problems or concerns. Patient denies SI/HI/SIB. No evidence of AH/VH or delusions.       Appetite:no change from normal   Sleep: no change     Response to Treatment: Positive   Side Effects: none      Supportive/Cognitive/Reality-Oriented psychotherapy provided in regards to psychosocial stressors:   pre-admission and current problems   Housing issues   Occupational issues   Academic issues   Legal issues   Medical issues   Interpersonal conflicts   Stress of hospitalization  Psychoeducation provided  Treatment plan reviewed with patient-including diagnosis and medications  Worked on issues of denial & effects of substance dependency/use      OBJECTIVE:                 Mental Status exam: WNL except for      Sensorium  oriented to time, place and person   Relations cooperative    Eye Contact    appropriate   Appearance:  age appropriate, casually dressed and within normal Limits   Motor Behavior:  within normal limits   Speech:  normal pitch and normal volume   Thought Process: within normal limits   Thought Content free of delusions and free of hallucinations   Suicidal ideations none   Homicidal ideations none   Mood:  stable   Affect:  stable   Memory recent  adequate   Memory remote:  adequate   Concentration:  adequate   Abstraction:  abstract   Insight:  good   Reliability good   Judgment:  good       Allergies   Allergen Reactions    Hydrochlorothiazide Other (comments) hyponatremia        Current Outpatient Prescriptions   Medication Sig Dispense Refill    sertraline (ZOLOFT) 50 mg tablet TAKE 1 TABLET DAILY FOR ANXIETY WITH DEPRESSION 90 Tab 0    divalproex DR (DEPAKOTE) 250 mg tablet TAKE 1 TABLET TWICE A DAY (BIPOLAR DISORDER IN REMISSION) 180 Tab 0    loratadine (CLARITIN) 10 mg tablet TAKE ONE TABLET BY MOUTH ONE TIME DAILY 30 Tab 11    montelukast (SINGULAIR) 10 mg tablet Take 1 Tab by mouth daily. 90 Tab 3    albuterol (PROAIR HFA) 90 mcg/actuation inhaler INHALE TWO PUFFS BY MOUTH EVERY FOUR HOURS AS NEEDED FOR WHEEZING 3 Inhaler 3    pravastatin (PRAVACHOL) 80 mg tablet TAKE ONE TABLET BY MOUTH NIGHTLY AT BEDTIME 90 Tab 3    lisinopril (PRINIVIL, ZESTRIL) 40 mg tablet TAKE 1/2 TABLET BY MOUTH TWICE DAILY. 90 Tab 3    omeprazole (PRILOSEC) 20 mg capsule TAKE ONE CAPSULE BY MOUTH ONE TIME DAILY 90 Cap 3    amLODIPine (NORVASC) 10 mg tablet TAKE ONE TABLET BY MOUTH ONE TIME DAILY 90 Tab 3    tamsulosin (FLOMAX) 0.4 mg capsule Take 1 Cap by mouth daily. 90 Cap 3    beclomethasone (QVAR) 80 mcg/actuation inhaler Take 2 Puffs by inhalation two (2) times a day. 8.7 g 5    acyclovir (ZOVIRAX) 200 mg capsule Take  by mouth every four (4) hours (while awake). Medication Changes/Adjustments: There are no discontinued medications. ASSESSMENT:  Rah Robles  is a 65 y.o.  male patient presented for his f/u appointment. Pt is stable on current meds. Diagnoses:   Axis I: Bipolar disorder, in remission  Anxiety disorder   Axis II: Deferred   Axis III: HTN, GERD, Borderline DM, left eye cataract   Axis IV:Mild   Axis V: 71-79    RECOMMENDATIONS/PLAN: Continue with the current meds, ordered Depakote Level  1. Medications:   No orders of the defined types were placed in this encounter. 2.  Follow-up Disposition:  Return in about 6 months (around 4/19/2018).

## 2017-11-13 ENCOUNTER — OFFICE VISIT (OUTPATIENT)
Dept: INTERNAL MEDICINE CLINIC | Age: 62
End: 2017-11-13

## 2017-11-13 VITALS
SYSTOLIC BLOOD PRESSURE: 119 MMHG | TEMPERATURE: 97.9 F | BODY MASS INDEX: 32.52 KG/M2 | WEIGHT: 214.6 LBS | DIASTOLIC BLOOD PRESSURE: 56 MMHG | HEART RATE: 83 BPM | RESPIRATION RATE: 16 BRPM | OXYGEN SATURATION: 95 % | HEIGHT: 68 IN

## 2017-11-13 DIAGNOSIS — I49.9 IRREGULAR HEART BEATS: ICD-10-CM

## 2017-11-13 DIAGNOSIS — F31.30 BIPOLAR DISORDER, MOST RECENT EPISODE DEPRESSED (HCC): ICD-10-CM

## 2017-11-13 DIAGNOSIS — E78.5 DYSLIPIDEMIA: ICD-10-CM

## 2017-11-13 DIAGNOSIS — R73.02 IGT (IMPAIRED GLUCOSE TOLERANCE): ICD-10-CM

## 2017-11-13 DIAGNOSIS — I10 ESSENTIAL HYPERTENSION: ICD-10-CM

## 2017-11-13 DIAGNOSIS — J45.909 UNCOMPLICATED ASTHMA, UNSPECIFIED ASTHMA SEVERITY, UNSPECIFIED WHETHER PERSISTENT: Primary | ICD-10-CM

## 2017-11-13 NOTE — PROGRESS NOTES
HISTORY OF PRESENT ILLNESS  Hoa Waldron is a 65 y.o. male. HPI  Presents for f/u HTN, asthma, lipids    Pt has appt with GI to review options for colon screening    Asthma reported stable    Monitoring diet    Psych requests depakote level. Seeing Dr. Shubham Bourne - urology - all stable with NL PSA    No palpitations, no SOB    Past medical, Social, and Family history reviewed  Medications reviewed and updated. ROS  Complete ROS reviewed and negative or stable except as noted in HPI. Physical Exam   Constitutional: He is oriented to person, place, and time. He appears well-nourished. No distress. HENT:   Head: Normocephalic and atraumatic. Mouth/Throat: Oropharynx is clear and moist.   Eyes: EOM are normal. Pupils are equal, round, and reactive to light. No scleral icterus. Neck: Normal range of motion. Neck supple. No JVD present. Cardiovascular: Normal rate, regular rhythm and normal heart sounds. Exam reveals no gallop and no friction rub. No murmur heard. Irregularity noted. Pulmonary/Chest: Effort normal and breath sounds normal. No respiratory distress. He has no wheezes. He has no rales. Prolonged exp phase   Abdominal: Soft. He exhibits no distension. There is no tenderness. Musculoskeletal: Normal range of motion. He exhibits no edema. Lymphadenopathy:     He has no cervical adenopathy. Neurological: He is alert and oriented to person, place, and time. He exhibits normal muscle tone. Skin: Skin is warm. No rash noted. Psychiatric: He has a normal mood and affect. Nursing note and vitals reviewed. Prior labs reviewed. EKG - I read it as NSR with sinus pauses and a PVC. ? Afib/flutter appearance in limb leads, but very regular ventricular response  Reviewed prior imaging reports      ASSESSMENT and PLAN  ? rhythm  Concern for Afib/flutter and sinus pauses. ICD-10-CM ICD-9-CM    1.  Uncomplicated asthma, unspecified asthma severity, unspecified whether persistent J45.909 493.90    2. Essential hypertension I10 401.9 CBC WITH AUTOMATED DIFF   3. Bipolar disorder, most recent episode depressed ([facility] [address] F31.30 296.50 VALPROIC ACID   4. Dyslipidemia E78.5 272.4 CK      LIPID PANEL      METABOLIC PANEL, COMPREHENSIVE   5. IGT (impaired glucose tolerance) R73.02 790.22 HEMOGLOBIN A1C WITH EAG   6. Irregular heart beats I49.9 427.9 AMB POC EKG ROUTINE W/ 12 LEADS, INTER & REP      CARDIAC HOLTER MONITOR, 24 HOURS     Follow-up Disposition:  Return in about 6 months (around 5/13/2018), or if symptoms worsen or fail to improve, for cholesterol, pre-diabetes. results and schedule of future studies reviewed with patient  reviewed diet, exercise and weight    cardiovascular risk and specific lipid/LDL goals reviewed  reviewed medications and side effects in detail   Fasting labs  Forward depakote level to Dr. Tash Waite current medications  holter monitor to define rhythm better  Ref to cards as indicated.

## 2017-11-13 NOTE — MR AVS SNAPSHOT
Visit Information Date & Time Provider Department Dept. Phone Encounter #  
 11/13/2017  8:00 AM Fay Denver, MD [facility] Pediatrics and Internal Medicine [phone] [id number] Follow-up Instructions Return in about 6 months (around 5/13/2018), or if symptoms worsen or fail to improve, for cholesterol, pre-diabetes. Your Appointments 4/19/2018  3:45 PM  
ESTABLISHED PATIENT with MD Rakesh Wynn 5 Ashlie Nassar) Appt Note:   
 [address] [facility] [address] [place] [address]  
[phone] [address] [place] [address] Upcoming Health Maintenance Date Due COLONOSCOPY 8/15/2017 DTaP/Tdap/Td series (2 - Td) 2/8/2021 Allergies as of 11/13/2017  Review Complete On: 11/13/2017 By: Fay Denver, MD  
  
 Severity Noted Reaction Type Reactions Hydrochlorothiazide  04/19/2016    Other (comments)  
 hyponatremia Current Immunizations  Reviewed on 11/13/2017 Name Date Influenza Vaccine 10/8/2017, 10/10/2016, 9/26/2013 Influenza Vaccine (Quad) PF 10/12/2015, 10/13/2014 Influenza Vaccine Whole 9/26/2012 Pneumococcal Polysaccharide (PPSV-23) 5/18/2015 TDAP Vaccine 2/8/2011 Zoster Vaccine, Live 5/7/2013 Reviewed by Fay Denver, MD on 11/13/2017 at  8:26 AM  
You Were Diagnosed With   
  
 Codes Comments Uncomplicated asthma, unspecified asthma severity, unspecified whether persistent    -  Primary ICD-10-CM: J45.909 ICD-9-CM: 493.90 Essential hypertension     ICD-10-CM: I10 
ICD-9-CM: 401.9 Bipolar disorder, most recent episode depressed ([facility]     ICD-10-CM: F31.30 ICD-9-CM: 296.50 Dyslipidemia     ICD-10-CM: E78.5 ICD-9-CM: 272.4 IGT (impaired glucose tolerance)     ICD-10-CM: R73.02 
ICD-9-CM: 790.22 Irregular heart beats     ICD-10-CM: I49.9 ICD-9-CM: 427.9 Vitals BP Pulse Temp Resp Height(growth percentile) Weight(growth percentile) 119/56 (BP 1 Location: Left arm, BP Patient Position: Sitting) 83 97.9 °F (36.6 °C) 16 5' 8\" (1.727 m) 214 lb 9.6 oz (97.3 kg) SpO2 BMI Smoking Status 95% 32.63 kg/m2 Former Smoker BMI and BSA Data Body Mass Index Body Surface Area  
 32.63 kg/m 2 2.16 m 2 Preferred Pharmacy Pharmacy Name Phone [address] [facility] [phone] Your Updated Medication List  
  
   
This list is accurate as of: 11/13/17  9:21 AM.  Always use your most recent med list.  
  
  
  
  
 acyclovir 200 mg capsule Commonly known as:  ZOVIRAX Take  by mouth every four (4) hours (while awake). albuterol 90 mcg/actuation inhaler Commonly known as:  PROAIR HFA INHALE TWO PUFFS BY MOUTH EVERY FOUR HOURS AS NEEDED FOR WHEEZING  
  
 amLODIPine 10 mg tablet Commonly known as:  Mueller Del TAKE ONE TABLET BY MOUTH ONE TIME DAILY  
  
 beclomethasone 80 mcg/actuation Aero Commonly known as:  QVAR Take 2 Puffs by inhalation two (2) times a day. divalproex  mg tablet Commonly known as:  DEPAKOTE  
TAKE 1 TABLET TWICE A DAY (BIPOLAR DISORDER IN REMISSION)  
  
 lisinopril 40 mg tablet Commonly known as:  PRINIVIL, ZESTRIL  
TAKE 1/2 TABLET BY MOUTH TWICE DAILY. loratadine 10 mg tablet Commonly known as:  CLARITIN  
TAKE ONE TABLET BY MOUTH ONE TIME DAILY  
  
 montelukast 10 mg tablet Commonly known as:  SINGULAIR Take 1 Tab by mouth daily. omeprazole 20 mg capsule Commonly known as:  PRILOSEC  
TAKE ONE CAPSULE BY MOUTH ONE TIME DAILY pravastatin 80 mg tablet Commonly known as:  PRAVACHOL  
TAKE ONE TABLET BY MOUTH NIGHTLY AT BEDTIME  
  
 sertraline 50 mg tablet Commonly known as:  ZOLOFT  
TAKE 1 TABLET DAILY FOR ANXIETY WITH DEPRESSION  
  
 tamsulosin 0.4 mg capsule Commonly known as:  FLOMAX Take 1 Cap by mouth daily. We Performed the Following AMB POC EKG ROUTINE W/ 12 LEADS, INTER & REP [90999 CPT(R)] CBC WITH AUTOMATED DIFF [19437 CPT(R)] CK [id number] CPT(R)] HEMOGLOBIN A1C WITH EAG [06194 CPT(R)] LIPID PANEL [07956 CPT(R)] METABOLIC PANEL, COMPREHENSIVE [87127 CPT(R)] VALPROIC ACID [47467 CPT(R)] Follow-up Instructions Return in about 6 months (around 5/13/2018), or if symptoms worsen or fail to improve, for cholesterol, pre-diabetes. To-Do List   
 11/15/2017 ECG:  CARDIAC HOLTER MONITOR, 24 HOURS Introducing [facility] & [facility] SERVICES! Dear Julian Dunne: Thank you for requesting a Rome2rio account. Our records indicate that you already have an active Rome2rio account. You can access your account anytime at [email or web address] Did you know that you can access your hospital and ER discharge instructions at any time in Rome2rio? You can also review all of your test results from your hospital stay or ER visit. Additional Information If you have questions, please visit the Frequently Asked Questions section of the Rome2rio website at [email or web address] Remember, Rome2rio is NOT to be used for urgent needs. For medical emergencies, dial 911. Now available from your iPhone and Android! Please provide this summary of care documentation to your next provider. Your primary care clinician is listed as [address] If you have any questions after today's visit, please call [phone].

## 2017-11-13 NOTE — PROGRESS NOTES
Rm 13    Chief Complaint   Patient presents with    Follow-up     asthma, BP     1. Have you been to the ER, urgent care clinic since your last visit? Hospitalized since your last visit? No    2. Have you seen or consulted any other health care providers outside of the [address] [place] [address] since your last visit? Include any pap smears or colon screening.  No    Health Maintenance Due   Topic Date Due    COLONOSCOPY  08/15/2017    Pneumococcal 19-64 Highest Risk (3 of 3 - PCV13) 11/10/2017

## 2017-11-14 LAB
ALBUMIN SERPL-MCNC: 4.5 G/DL (ref 3.6–4.8)
ALBUMIN/GLOB SERPL: 1.6 {RATIO} (ref 1.2–2.2)
ALP SERPL-CCNC: 95 IU/L (ref 39–117)
ALT SERPL-CCNC: 7 IU/L (ref 0–44)
AST SERPL-CCNC: 12 IU/L (ref 0–40)
BASOPHILS # BLD AUTO: 0 X10E3/UL (ref 0–0.2)
BASOPHILS NFR BLD AUTO: 0 %
BILIRUB SERPL-MCNC: 0.2 MG/DL (ref 0–1.2)
BUN SERPL-MCNC: 28 MG/DL (ref 8–27)
BUN/CREAT SERPL: 28 (ref 10–24)
CALCIUM SERPL-MCNC: 9.4 MG/DL (ref 8.6–10.2)
CHLORIDE SERPL-SCNC: 98 MMOL/L (ref 96–106)
CHOLEST SERPL-MCNC: 164 MG/DL (ref 100–199)
CK SERPL-CCNC: 65 U/L (ref 24–204)
CO2 SERPL-SCNC: 24 MMOL/L (ref 18–29)
CREAT SERPL-MCNC: 1 MG/DL (ref 0.76–1.27)
EOSINOPHIL # BLD AUTO: 0.2 X10E3/UL (ref 0–0.4)
EOSINOPHIL NFR BLD AUTO: 2 %
ERYTHROCYTE [DISTWIDTH] IN BLOOD BY AUTOMATED COUNT: 13.2 % (ref 12.3–15.4)
EST. AVERAGE GLUCOSE BLD GHB EST-MCNC: 120 MG/DL
GLOBULIN SER CALC-MCNC: 2.9 G/DL (ref 1.5–4.5)
GLUCOSE SERPL-MCNC: 103 MG/DL (ref 65–99)
HBA1C MFR BLD: 5.8 % (ref 4.8–5.6)
HCT VFR BLD AUTO: 40.2 % (ref 37.5–51)
HDLC SERPL-MCNC: 30 MG/DL
HGB BLD-MCNC: 13.8 G/DL (ref 12.6–17.7)
IMM GRANULOCYTES # BLD: 0 X10E3/UL (ref 0–0.1)
IMM GRANULOCYTES NFR BLD: 0 %
LDLC SERPL CALC-MCNC: 94 MG/DL (ref 0–99)
LYMPHOCYTES # BLD AUTO: 2.5 X10E3/UL (ref 0.7–3.1)
LYMPHOCYTES NFR BLD AUTO: 25 %
MCH RBC QN AUTO: 32.6 PG (ref 26.6–33)
MCHC RBC AUTO-ENTMCNC: 34.3 G/DL (ref 31.5–35.7)
MCV RBC AUTO: 95 FL (ref 79–97)
MONOCYTES # BLD AUTO: 0.8 X10E3/UL (ref 0.1–0.9)
MONOCYTES NFR BLD AUTO: 8 %
NEUTROPHILS # BLD AUTO: 6.4 X10E3/UL (ref 1.4–7)
NEUTROPHILS NFR BLD AUTO: 65 %
PLATELET # BLD AUTO: 254 X10E3/UL (ref 150–379)
POTASSIUM SERPL-SCNC: 4.6 MMOL/L (ref 3.5–5.2)
PROT SERPL-MCNC: 7.4 G/DL (ref 6–8.5)
RBC # BLD AUTO: 4.23 X10E6/UL (ref 4.14–5.8)
SODIUM SERPL-SCNC: 139 MMOL/L (ref 134–144)
TRIGL SERPL-MCNC: 199 MG/DL (ref 0–149)
VALPROATE SERPL-MCNC: 54 UG/ML (ref 50–100)
VLDLC SERPL CALC-MCNC: 40 MG/DL (ref 5–40)
WBC # BLD AUTO: 9.9 X10E3/UL (ref 3.4–10.8)

## 2017-11-21 ENCOUNTER — HOSPITAL ENCOUNTER (OUTPATIENT)
Dept: NON INVASIVE DIAGNOSTICS | Age: 62
Discharge: HOME | End: 2017-11-21
Attending: INTERNAL MEDICINE
Payer: COMMERCIAL

## 2017-11-21 DIAGNOSIS — I49.9 IRREGULAR HEART BEATS: ICD-10-CM

## 2017-11-21 PROCEDURE — 93225 XTRNL ECG REC<48 HRS REC: CPT

## 2017-11-27 DIAGNOSIS — I49.3 FREQUENT UNIFOCAL PVCS: Primary | ICD-10-CM

## 2017-12-01 ENCOUNTER — HOSPITAL ENCOUNTER (OUTPATIENT)
Dept: NON INVASIVE DIAGNOSTICS | Age: 62
Discharge: HOME | End: 2017-12-01
Attending: INTERNAL MEDICINE
Payer: COMMERCIAL

## 2017-12-01 DIAGNOSIS — I49.3 FREQUENT UNIFOCAL PVCS: ICD-10-CM

## 2017-12-01 PROCEDURE — 93306 TTE W/DOPPLER COMPLETE: CPT

## 2017-12-13 ENCOUNTER — HOSPITAL ENCOUNTER (OUTPATIENT)
Dept: GENERAL RADIOLOGY | Age: 62
Discharge: HOME | End: 2017-12-13
Attending: INTERNAL MEDICINE
Payer: COMMERCIAL

## 2017-12-13 DIAGNOSIS — K57.30 DIVERTICULOSIS OF COLON: ICD-10-CM

## 2017-12-13 PROCEDURE — 74270 X-RAY XM COLON 1CNTRST STD: CPT

## 2017-12-18 ENCOUNTER — DOCUMENTATION ONLY (OUTPATIENT)
Dept: INTERNAL MEDICINE CLINIC | Age: 62
End: 2017-12-18

## 2018-03-15 RX ORDER — SERTRALINE HYDROCHLORIDE 50 MG/1
TABLET, FILM COATED ORAL
Qty: 90 TAB | Refills: 0 | Status: SHIPPED | OUTPATIENT
Start: 2018-03-15 | End: 2018-04-02 | Stop reason: SDUPTHER

## 2018-03-15 RX ORDER — DIVALPROEX SODIUM 250 MG/1
TABLET, DELAYED RELEASE ORAL
Qty: 180 TAB | Refills: 0 | Status: SHIPPED | OUTPATIENT
Start: 2018-03-15 | End: 2018-04-02 | Stop reason: SDUPTHER

## 2018-03-15 RX ORDER — ALBUTEROL SULFATE 90 UG/1
AEROSOL, METERED RESPIRATORY (INHALATION)
Qty: 3 INHALER | Refills: 3 | Status: SHIPPED | OUTPATIENT
Start: 2018-03-15 | End: 2018-11-14 | Stop reason: SDUPTHER

## 2018-04-02 ENCOUNTER — OFFICE VISIT (OUTPATIENT)
Dept: BEHAVIORAL/MENTAL HEALTH CLINIC | Age: 63
End: 2018-04-02

## 2018-04-02 VITALS
HEIGHT: 68 IN | TEMPERATURE: 98.7 F | OXYGEN SATURATION: 93 % | SYSTOLIC BLOOD PRESSURE: 135 MMHG | BODY MASS INDEX: 34.1 KG/M2 | WEIGHT: 225 LBS | DIASTOLIC BLOOD PRESSURE: 94 MMHG | RESPIRATION RATE: 18 BRPM | HEART RATE: 113 BPM

## 2018-04-02 DIAGNOSIS — F41.9 ANXIETY DISORDER, UNSPECIFIED TYPE: ICD-10-CM

## 2018-04-02 DIAGNOSIS — F31.70 BIPOLAR DISORDER IN REMISSION (HCC): Primary | ICD-10-CM

## 2018-04-02 RX ORDER — DIVALPROEX SODIUM 250 MG/1
TABLET, DELAYED RELEASE ORAL
Qty: 180 TAB | Refills: 1 | Status: SHIPPED | OUTPATIENT
Start: 2018-04-02 | End: 2018-09-20 | Stop reason: SDUPTHER

## 2018-04-02 RX ORDER — SERTRALINE HYDROCHLORIDE 50 MG/1
TABLET, FILM COATED ORAL
Qty: 90 TAB | Refills: 1 | Status: SHIPPED | OUTPATIENT
Start: 2018-04-02 | End: 2018-09-20 | Stop reason: SDUPTHER

## 2018-04-02 NOTE — PROGRESS NOTES
Danilo Valentin is a 65 y.o. male  Chief Complaint   Patient presents with    Mental Health Problem     bipolar follow up appointment     1. Have you been to the ER, urgent care clinic since your last visit? Hospitalized since your last visit? No     2. Have you seen or consulted any other health care providers outside of the [address] [place] [address] since your last visit? Include any pap smears or colon screening.        No     Visit Vitals    BP (!) 135/94 (BP 1 Location: Right arm, BP Patient Position: Sitting)    Pulse (!) 113    Temp 98.7 °F (37.1 °C) (Oral)    Resp 18    Ht 5' 8\" (1.727 m)    Wt 102.1 kg (225 lb)    SpO2 93%    BMI 34.21 kg/m2     Patient reports HR is most likely elevated due to the walk from parking lot to office and typically has higher heart rate and BP when at a doctor's office

## 2018-04-02 NOTE — PROGRESS NOTES
Psychiatric Progress Note    Date: 4/2/2018  Account Number:  [id number]  Name: Kailey Farnsworth    Length of psychotherapy session: 15 minutes     Total Patient Care Time Spent: 20 minutes : (Coordinated care:  counseling time with patient, individual psychotherapy with patient; discussions with family members and chart review). SUBJECTIVE:   Kailey Farnsworth  is a 65 y.o.  male  patient presents for a therapy/psychopharmacological management appointment. Pt reports doing well, mood is stable on current meds,  taking meds without any problems or concerns. Has had his Depakote level checked in Nov 2017 which was 54, WNL. Patient denies SI/HI/SIB. No evidence of AH/VH or delusions.       Appetite:no change from normal   Sleep: no change     Response to Treatment: Positive   Side Effects: none      Supportive/Cognitive/Reality-Oriented psychotherapy provided in regards to psychosocial stressors:   pre-admission and current problems   Housing issues   Occupational issues   Academic issues   Legal issues   Medical issues   Interpersonal conflicts   Stress of hospitalization  Psychoeducation provided  Treatment plan reviewed with patient-including diagnosis and medications  Worked on issues of denial & effects of substance dependency/use      OBJECTIVE:                 Mental Status exam: WNL except for      Sensorium  oriented to time, place and person   Relations cooperative    Eye Contact    appropriate   Appearance:  age appropriate, casually dressed, overeweight   Motor Behavior:  within normal limits   Speech:  normal pitch and normal volume   Thought Process: within normal limits   Thought Content free of delusions and free of hallucinations   Suicidal ideations none   Homicidal ideations none   Mood:  stable   Affect:  stable   Memory recent  adequate   Memory remote:  adequate   Concentration:  adequate   Abstraction:  abstract   Insight:  good   Reliability good   Judgment:  good       Allergies   Allergen Reactions    Hydrochlorothiazide Other (comments)     hyponatremia        Current Outpatient Prescriptions   Medication Sig Dispense Refill    sertraline (ZOLOFT) 50 mg tablet TAKE 1 TABLET DAILY FOR ANXIETY WITH DEPRESSION 90 Tab 1    divalproex DR (DEPAKOTE) 250 mg tablet TAKE 1 TABLET TWICE A DAY (BIPOLAR DISORDER IN REMISSION) 180 Tab 1    PROAIR HFA 90 mcg/actuation inhaler USE 2 INHALATIONS EVERY 4 HOURS AS NEEDED FOR WHEEZING 3 Inhaler 3    loratadine (CLARITIN) 10 mg tablet TAKE ONE TABLET BY MOUTH ONE TIME DAILY 30 Tab 11    pravastatin (PRAVACHOL) 80 mg tablet TAKE ONE TABLET BY MOUTH NIGHTLY AT BEDTIME 90 Tab 3    lisinopril (PRINIVIL, ZESTRIL) 40 mg tablet TAKE 1/2 TABLET BY MOUTH TWICE DAILY. 90 Tab 3    omeprazole (PRILOSEC) 20 mg capsule TAKE ONE CAPSULE BY MOUTH ONE TIME DAILY 90 Cap 3    amLODIPine (NORVASC) 10 mg tablet TAKE ONE TABLET BY MOUTH ONE TIME DAILY 90 Tab 3    tamsulosin (FLOMAX) 0.4 mg capsule Take 1 Cap by mouth daily. 90 Cap 3    beclomethasone (QVAR) 80 mcg/actuation inhaler Take 2 Puffs by inhalation two (2) times a day. 8.7 g 5    acyclovir (ZOVIRAX) 200 mg capsule Take  by mouth every four (4) hours (while awake).  montelukast (SINGULAIR) 10 mg tablet Take 1 Tab by mouth daily. 90 Tab 3        Medication Changes/Adjustments:   Medications Discontinued During This Encounter   Medication Reason    sertraline (ZOLOFT) 50 mg tablet Reorder    divalproex DR (DEPAKOTE) 250 mg tablet Reorder          ASSESSMENT:  Hemalatha Sams  is a 65 y.o.  male patient presented for his f/u appointment. Pt is stable on current meds. Diagnoses:   Bipolar disorder, in remission  Anxiety disorder   HTN, GERD, Borderline DM, left eye cataract       RECOMMENDATIONS/PLAN: Continue with the current meds, Depakote level 54, WNL checked on 11/13/2017  1. Medications:   Orders Placed This Encounter    sertraline (ZOLOFT) 50 mg tablet    divalproex DR (DEPAKOTE) 250 mg tablet      2.   Follow-up Disposition: Not on File Pt plans to f/u with Bridget Salmons [facility] [place] [facility] as provider is leaving the practice.

## 2018-04-02 NOTE — MR AVS SNAPSHOT
[address] [facility] [address] [place] [address] 
[phone] Patient: Soila Huston MRN: [id number] LTA:[birth year] Visit Information Date & Time Provider Department Dept. Phone Encounter #  
 4/2/2018  3:30 PM Marycarmen Montague MD UlKatheryn Jarisabella 5 [phone] [id number] Your Appointments 5/11/2018  8:15 AM  
ROUTINE CARE with Darylene Standing, MD  
[facility] Pediatrics and Internal Medicine [address] [place] [address]) Appt Note: 6 mo f/u cholesterol [address] [facility] [address] [facility] [address]  
[place] [address] [facility] [address] Upcoming Health Maintenance Date Due DTaP/Tdap/Td series (2 - Td) 2/8/2021 COLONOSCOPY 12/13/2022 Allergies as of 4/2/2018  Review Complete On: 4/2/2018 By: Marycarmen Montague MD  
  
 Severity Noted Reaction Type Reactions Hydrochlorothiazide  04/19/2016    Other (comments)  
 hyponatremia Current Immunizations  Reviewed on 11/13/2017 Name Date Influenza Vaccine 10/8/2017, 10/10/2016, 9/26/2013 Influenza Vaccine (Quad) PF 10/12/2015, 10/13/2014 Influenza Vaccine Whole 9/26/2012 Pneumococcal Polysaccharide (PPSV-23) 5/18/2015 TDAP Vaccine 2/8/2011 Zoster Vaccine, Live 5/7/2013 Not reviewed this visit You Were Diagnosed With   
  
 Codes Comments Bipolar disorder in remission [facility])    -  Primary ICD-10-CM: F31.70 ICD-9-CM: 296.80 Anxiety disorder, unspecified type     ICD-10-CM: F41.9 ICD-9-CM: 300.00 Vitals BP Pulse Temp Resp Height(growth percentile) Weight(growth percentile) (!) 135/94 (BP 1 Location: Right arm, BP Patient Position: Sitting) (!) 113 98.7 °F (37.1 °C) (Oral) 18 5' 8\" (1.727 m) 225 lb (102.1 kg) SpO2 BMI Smoking Status 93% 34.21 kg/m2 Former Smoker Vitals History BMI and BSA Data  Body Mass Index Body Surface Area  
 34.21 kg/m 2 2.21 m 2  
 Preferred Pharmacy Pharmacy Name Phone [facility], [facility] [phone] Your Updated Medication List  
  
   
This list is accurate as of 4/2/18  3:32 PM.  Always use your most recent med list.  
  
  
  
  
 acyclovir 200 mg capsule Commonly known as:  ZOVIRAX Take  by mouth every four (4) hours (while awake). amLODIPine 10 mg tablet Commonly known as:  Leyda Burt TAKE ONE TABLET BY MOUTH ONE TIME DAILY  
  
 beclomethasone 80 mcg/actuation Aero Commonly known as:  QVAR Take 2 Puffs by inhalation two (2) times a day. divalproex  mg tablet Commonly known as:  DEPAKOTE  
TAKE 1 TABLET TWICE A DAY (BIPOLAR DISORDER IN REMISSION)  
  
 lisinopril 40 mg tablet Commonly known as:  PRINIVIL, ZESTRIL  
TAKE 1/2 TABLET BY MOUTH TWICE DAILY. loratadine 10 mg tablet Commonly known as:  CLARITIN  
TAKE ONE TABLET BY MOUTH ONE TIME DAILY  
  
 montelukast 10 mg tablet Commonly known as:  SINGULAIR Take 1 Tab by mouth daily. omeprazole 20 mg capsule Commonly known as:  PRILOSEC  
TAKE ONE CAPSULE BY MOUTH ONE TIME DAILY pravastatin 80 mg tablet Commonly known as:  PRAVACHOL  
TAKE ONE TABLET BY MOUTH NIGHTLY AT BEDTIME  
  
 PROAIR HFA 90 mcg/actuation inhaler Generic drug:  albuterol USE 2 INHALATIONS EVERY 4 HOURS AS NEEDED FOR WHEEZING  
  
 sertraline 50 mg tablet Commonly known as:  ZOLOFT  
TAKE 1 TABLET DAILY FOR ANXIETY WITH DEPRESSION  
  
 tamsulosin 0.4 mg capsule Commonly known as:  FLOMAX Take 1 Cap by mouth daily. Prescriptions Sent to Pharmacy Refills  
 sertraline (ZOLOFT) 50 mg tablet 1 Sig: TAKE 1 TABLET DAILY FOR ANXIETY WITH DEPRESSION  Class: Normal  
 Pharmacy: [facility] Kelly Sprague, [address] [place] [address] #: [phone]  
 divalproex DR (DEPAKOTE) 250 mg tablet 1  
 Sig: TAKE 1 TABLET TWICE A DAY (BIPOLAR DISORDER IN REMISSION) Class: Normal  
 Pharmacy: [address], [address] [facility] #: [phone] Introducing [facility] & [facility] SERVICES! Dear Ellis Line: Thank you for requesting a BalaBit account. Our records indicate that you already have an active BalaBit account. You can access your account anytime at [email or web address] Did you know that you can access your hospital and ER discharge instructions at any time in BalaBit? You can also review all of your test results from your hospital stay or ER visit. Additional Information If you have questions, please visit the Frequently Asked Questions section of the BalaBit website at [email or web address] Remember, BalaBit is NOT to be used for urgent needs. For medical emergencies, dial 911. Now available from your iPhone and Android! Please provide this summary of care documentation to your next provider. Your primary care clinician is listed as [address] If you have any questions after today's visit, please call [phone].

## 2018-05-11 ENCOUNTER — OFFICE VISIT (OUTPATIENT)
Dept: INTERNAL MEDICINE CLINIC | Age: 63
End: 2018-05-11

## 2018-05-11 VITALS
TEMPERATURE: 97.9 F | RESPIRATION RATE: 16 BRPM | WEIGHT: 220.4 LBS | OXYGEN SATURATION: 92 % | BODY MASS INDEX: 33.4 KG/M2 | HEART RATE: 69 BPM | SYSTOLIC BLOOD PRESSURE: 131 MMHG | HEIGHT: 68 IN | DIASTOLIC BLOOD PRESSURE: 75 MMHG

## 2018-05-11 DIAGNOSIS — F41.8 DEPRESSION WITH ANXIETY: ICD-10-CM

## 2018-05-11 DIAGNOSIS — J45.909 UNCOMPLICATED ASTHMA, UNSPECIFIED ASTHMA SEVERITY, UNSPECIFIED WHETHER PERSISTENT: ICD-10-CM

## 2018-05-11 DIAGNOSIS — I10 ESSENTIAL HYPERTENSION WITH GOAL BLOOD PRESSURE LESS THAN 140/90: ICD-10-CM

## 2018-05-11 DIAGNOSIS — N40.1 BENIGN PROSTATIC HYPERPLASIA WITH NOCTURIA: ICD-10-CM

## 2018-05-11 DIAGNOSIS — K21.9 GASTROESOPHAGEAL REFLUX DISEASE WITHOUT ESOPHAGITIS: ICD-10-CM

## 2018-05-11 DIAGNOSIS — E78.5 DYSLIPIDEMIA: ICD-10-CM

## 2018-05-11 DIAGNOSIS — R73.02 IGT (IMPAIRED GLUCOSE TOLERANCE): ICD-10-CM

## 2018-05-11 DIAGNOSIS — I10 ESSENTIAL HYPERTENSION: Primary | ICD-10-CM

## 2018-05-11 DIAGNOSIS — R35.1 BENIGN PROSTATIC HYPERPLASIA WITH NOCTURIA: ICD-10-CM

## 2018-05-11 DIAGNOSIS — F31.32 BIPOLAR DISORDER, CURRENT EPISODE DEPRESSED, MODERATE (HCC): ICD-10-CM

## 2018-05-11 LAB — HBA1C MFR BLD HPLC: 5.7 % (ref 4.8–5.6)

## 2018-05-11 RX ORDER — OMEPRAZOLE 20 MG/1
CAPSULE, DELAYED RELEASE ORAL
Qty: 90 CAP | Refills: 3 | Status: SHIPPED | OUTPATIENT
Start: 2018-05-11 | End: 2019-05-28 | Stop reason: SDUPTHER

## 2018-05-11 RX ORDER — MONTELUKAST SODIUM 10 MG/1
10 TABLET ORAL DAILY
Qty: 90 TAB | Refills: 3 | Status: SHIPPED | OUTPATIENT
Start: 2018-05-11 | End: 2021-12-31 | Stop reason: ALTCHOICE

## 2018-05-11 RX ORDER — PRAVASTATIN SODIUM 80 MG/1
TABLET ORAL
Qty: 90 TAB | Refills: 1 | Status: SHIPPED | OUTPATIENT
Start: 2018-05-11 | End: 2018-08-31 | Stop reason: ALTCHOICE

## 2018-05-11 RX ORDER — LISINOPRIL 40 MG/1
TABLET ORAL
Qty: 90 TAB | Refills: 3 | Status: SHIPPED | OUTPATIENT
Start: 2018-05-11 | End: 2019-05-17 | Stop reason: SDUPTHER

## 2018-05-11 RX ORDER — AMLODIPINE BESYLATE 10 MG/1
TABLET ORAL
Qty: 90 TAB | Refills: 3 | Status: SHIPPED | OUTPATIENT
Start: 2018-05-11 | End: 2019-05-17 | Stop reason: SDUPTHER

## 2018-05-11 NOTE — PROGRESS NOTES
HPI:  Presents for f/u IGT, lipids, etc    Doing well, stable    Inquires re: crestor  Wife tolerating it well    Seeing urology and doing well  They are Rx'ing flomax and checking PSA    resp status doing fair  Using some albuterol  Resumed singulair recently    Past medical, Social, and Family history reviewed    Prior to Admission medications    Medication Sig Start Date End Date Taking? Authorizing Provider   sertraline (ZOLOFT) 50 mg tablet TAKE 1 TABLET DAILY FOR ANXIETY WITH DEPRESSION 4/2/18  Yes Elizabeth Welch MD   divalproex DR (DEPAKOTE) 250 mg tablet TAKE 1 TABLET TWICE A DAY (BIPOLAR DISORDER IN REMISSION) 4/2/18  Yes Elizabeth Welch MD   PROAIR HFA 90 mcg/actuation inhaler USE 2 INHALATIONS EVERY 4 HOURS AS NEEDED FOR WHEEZING 3/15/18  Yes Eli Valladares MD   loratadine (CLARITIN) 10 mg tablet TAKE ONE TABLET BY MOUTH ONE TIME DAILY 9/13/17  Yes Eli Valladares MD   montelukast (SINGULAIR) 10 mg tablet Take 1 Tab by mouth daily. 5/22/17  Yes Eli Valladares MD   pravastatin (PRAVACHOL) 80 mg tablet TAKE ONE TABLET BY MOUTH NIGHTLY AT BEDTIME 5/10/17  Yes Eli Valladares MD   lisinopril (PRINIVIL, ZESTRIL) 40 mg tablet TAKE 1/2 TABLET BY MOUTH TWICE DAILY. 5/10/17  Yes Eli Valladares MD   omeprazole (PRILOSEC) 20 mg capsule TAKE ONE CAPSULE BY MOUTH ONE TIME DAILY 5/10/17  Yes Eli Valladares MD   amLODIPine (NORVASC) 10 mg tablet TAKE ONE TABLET BY MOUTH ONE TIME DAILY 5/10/17  Yes Eli Valladares MD   tamsulosin [facility]) 0.4 mg capsule Take 1 Cap by mouth daily. 5/10/17  Yes Eli Valladares MD   beclomethasone (QVAR) 80 mcg/actuation inhaler Take 2 Puffs by inhalation two (2) times a day. 5/10/16  Yes Eli Valladares MD   acyclovir (ZOVIRAX) 200 mg capsule Take  by mouth every four (4) hours (while awake). Yes Historical Provider          ROS  Complete ROS reviewed and negative or stable except as noted in HPI.       Physical Exam   Constitutional: He is oriented to person, place, and time. He appears well-nourished. No distress. HENT:   Head: Normocephalic and atraumatic. Mouth/Throat: Oropharynx is clear and moist.   Eyes: EOM are normal. Pupils are equal, round, and reactive to light. No scleral icterus. Neck: Normal range of motion. Neck supple. No JVD present. Cardiovascular: Normal rate, regular rhythm and normal heart sounds. Exam reveals no gallop and no friction rub. No murmur heard. Irregularity noted. Pulmonary/Chest: Effort normal. No respiratory distress. He has wheezes (rare scattered). He has no rales. Prolonged exp phase   Abdominal: Soft. He exhibits no distension. There is no tenderness. Musculoskeletal: Normal range of motion. He exhibits no edema. Lymphadenopathy:     He has no cervical adenopathy. Neurological: He is alert and oriented to person, place, and time. He exhibits normal muscle tone. Skin: Skin is warm. No rash noted. Psychiatric: He has a normal mood and affect. Nursing note and vitals reviewed. Prior labs reviewed. Assessment/Plan:    ICD-10-CM ICD-9-CM    1. Essential hypertension I10 401.9 CBC WITH AUTOMATED DIFF   2. IGT (impaired glucose tolerance) R73.02 790.22 AMB POC HEMOGLOBIN A1C   3. Essential hypertension with goal blood pressure less than 140/90 I10 401.9 amLODIPine (NORVASC) 10 mg tablet   4. Bipolar disorder, current episode depressed, moderate ([facility] F31.32 296.52 VALPROIC ACID   5. Dyslipidemia E78.5 272.4 CK      LIPID PANEL      METABOLIC PANEL, COMPREHENSIVE   6. Benign prostatic hyperplasia with nocturia N40.1 600.01     R35.1 788.43    7. Depression with anxiety F41.8 300.4    8. Uncomplicated asthma, unspecified asthma severity, unspecified whether persistent J45.909 493.90    9. Gastroesophageal reflux disease without esophagitis K21.9 530.81      Follow-up Disposition:  Return in about 6 months (around 11/11/2018) for cholesterol, pre-diabetes.    results and schedule of future studies reviewed with patient  reviewed diet, exercise and weight   cardiovascular risk and specific lipid/LDL goals reviewed  reviewed medications and side effects in detail  Agreed to change to crestor with next refill  Continue current medications for now  Resume Qvar

## 2018-05-11 NOTE — MR AVS SNAPSHOT
[address] [facility] [address] [place] [address] 
[phone] Patient: Ginny Shirley MRN: [id number] GMO:1[birth year]19[birth year] Visit Information Date & Time Provider Department Dept. Phone Encounter #  
 5/11/2018  8:15 AM Aurelia Chavarria MD [facility] Pediatrics and Internal Medicine [phone] [id number] Follow-up Instructions Return in about 6 months (around 11/11/2018) for cholesterol, pre-diabetes. Your Appointments 9/26/2018  2:00 PM  
New Patient with Vale Zuñiga MD  
[address] [facility]) Appt Note: Dr Mccormack Husbands pt transfer [address] [facility] [address] [id number]  
[facility] [address] Upcoming Health Maintenance Date Due Influenza Age 5 to Adult 8/1/2018 DTaP/Tdap/Td series (2 - Td) 2/8/2021 COLONOSCOPY 12/13/2022 Allergies as of 5/11/2018  Review Complete On: 5/11/2018 By: Aurelia Chavarria MD  
  
 Severity Noted Reaction Type Reactions Hydrochlorothiazide  04/19/2016    Other (comments)  
 hyponatremia Current Immunizations  Reviewed on 5/11/2018 Name Date Influenza Vaccine 10/8/2017, 10/10/2016, 9/26/2013 Influenza Vaccine (Quad) PF 10/12/2015, 10/13/2014 Influenza Vaccine Whole 9/26/2012 Pneumococcal Polysaccharide (PPSV-23) 5/18/2015 TDAP Vaccine 2/8/2011 Zoster Vaccine, Live 5/7/2013 Reviewed by Aurelia Chavarria MD on 5/11/2018 at  9:10 AM  
You Were Diagnosed With   
  
 Codes Comments Essential hypertension    -  Primary ICD-10-CM: I10 
ICD-9-CM: 401.9 IGT (impaired glucose tolerance)     ICD-10-CM: R73.02 
ICD-9-CM: 790.22 Essential hypertension with goal blood pressure less than 140/90     ICD-10-CM: I10 
ICD-9-CM: 401.9  Bipolar disorder, current episode depressed, moderate ([facility]     ICD-10-CM: F31.32 
ICD-9-CM: 296.52   
 Dyslipidemia     ICD-10-CM: E78.5 ICD-9-CM: 272.4 Benign prostatic hyperplasia with nocturia     ICD-10-CM: N40.1, R35.1 ICD-9-CM: 600.01, 788.43 Depression with anxiety     ICD-10-CM: F41.8 ICD-9-CM: 300.4 Uncomplicated asthma, unspecified asthma severity, unspecified whether persistent     ICD-10-CM: J45.909 ICD-9-CM: 493.90 Gastroesophageal reflux disease without esophagitis     ICD-10-CM: K21.9 ICD-9-CM: 530.81 Vitals BP Pulse Temp Resp Height(growth percentile) Weight(growth percentile) 131/75 (BP 1 Location: Left arm, BP Patient Position: Sitting) 69 97.9 °F (36.6 °C) (Oral) 16 5' 8\" (1.727 m) 220 lb 6.4 oz (100 kg) SpO2 BMI Smoking Status 92% 33.51 kg/m2 Former Smoker BMI and BSA Data Body Mass Index Body Surface Area  
 33.51 kg/m 2 2.19 m 2 Preferred Pharmacy Pharmacy Name Phone [facility], [facility] [phone] Your Updated Medication List  
  
   
This list is accurate as of 5/11/18  9:18 AM.  Always use your most recent med list.  
  
  
  
  
 acyclovir 200 mg capsule Commonly known as:  ZOVIRAX Take  by mouth every four (4) hours (while awake). amLODIPine 10 mg tablet Commonly known as:  Sherrye Acron TAKE ONE TABLET BY MOUTH ONE TIME DAILY  
  
 beclomethasone 80 mcg/actuation Aero Commonly known as:  QVAR Take 2 Puffs by inhalation two (2) times a day. divalproex  mg tablet Commonly known as:  DEPAKOTE  
TAKE 1 TABLET TWICE A DAY (BIPOLAR DISORDER IN REMISSION)  
  
 lisinopril 40 mg tablet Commonly known as:  PRINIVIL, ZESTRIL  
TAKE 1/2 TABLET BY MOUTH TWICE DAILY. loratadine 10 mg tablet Commonly known as:  CLARITIN  
TAKE ONE TABLET BY MOUTH ONE TIME DAILY  
  
 montelukast 10 mg tablet Commonly known as:  SINGULAIR Take 1 Tab by mouth daily. omeprazole 20 mg capsule Commonly known as:  PRILOSEC  
 TAKE ONE CAPSULE BY MOUTH ONE TIME DAILY pravastatin 80 mg tablet Commonly known as:  PRAVACHOL  
TAKE ONE TABLET BY MOUTH NIGHTLY AT BEDTIME  
  
 PROAIR HFA 90 mcg/actuation inhaler Generic drug:  albuterol USE 2 INHALATIONS EVERY 4 HOURS AS NEEDED FOR WHEEZING  
  
 sertraline 50 mg tablet Commonly known as:  ZOLOFT  
TAKE 1 TABLET DAILY FOR ANXIETY WITH DEPRESSION  
  
 tamsulosin 0.4 mg capsule Commonly known as:  FLOMAX Take 1 Cap by mouth daily. Prescriptions Sent to Pharmacy Refills  
 pravastatin (PRAVACHOL) 80 mg tablet 1 Sig: TAKE ONE TABLET BY MOUTH NIGHTLY AT BEDTIME Class: Normal  
 Pharmacy: [address] Ph #: [phone]  
 lisinopril (PRINIVIL, ZESTRIL) 40 mg tablet 3 Sig: TAKE 1/2 TABLET BY MOUTH TWICE DAILY. Class: Normal  
 Pharmacy: [address] Ph #: [phone]  
 omeprazole (PRILOSEC) 20 mg capsule 3 Sig: TAKE ONE CAPSULE BY MOUTH ONE TIME DAILY Class: Normal  
 Pharmacy: [address] Ph #: [phone]  
 amLODIPine (NORVASC) 10 mg tablet 3 Sig: TAKE ONE TABLET BY MOUTH ONE TIME DAILY Class: Normal  
 Pharmacy: [address] Ph #: [phone]  
 beclomethasone (QVAR) 80 mcg/actuation aero 3 Sig: Take 2 Puffs by inhalation two (2) times a day. Class: Normal  
 Pharmacy: [address], [address] Ph #: [phone] Route: Inhalation  
 montelukast (SINGULAIR) 10 mg tablet 3 Sig: Take 1 Tab by mouth daily. Class: Normal  
 Pharmacy: [address], [address] Ph #: [phone] Route: Oral  
  
We Performed the Following AMB POC HEMOGLOBIN A1C [93221 CPT(R)] CBC WITH AUTOMATED DIFF [45017 CPT(R)] CK [id number] CPT(R)] LIPID PANEL [00198 CPT(R)] METABOLIC PANEL, COMPREHENSIVE [43908 CPT(R)] VALPROIC ACID [59942 CPT(R)] Follow-up Instructions Return in about 6 months (around 11/11/2018) for cholesterol, pre-diabetes. Introducing [facility]! Dear Eran Hylton: Thank you for requesting a [employer] account. Our records indicate that you already have an active [employer] account. You can access your account anytime at [email or web address] Did you know that you can access your hospital and ER discharge instructions at any time in [employer]? You can also review all of your test results from your hospital stay or ER visit. Additional Information If you have questions, please visit the Frequently Asked Questions section of the [employer] website at [email or web address] Remember, [employer] is NOT to be used for urgent needs. For medical emergencies, dial 911. Now available from your iPhone and Android! Please provide this summary of care documentation to your next provider. Your primary care clinician is listed as [address] If you have any questions after today's visit, please call [phone].

## 2018-05-11 NOTE — PROGRESS NOTES
Rm 14    Chief Complaint   Patient presents with    Follow-up     Pre- Dm and cholesterol   pt is fasting    1. Have you been to the ER, urgent care clinic since your last visit? Hospitalized since your last visit? No    2. Have you seen or consulted any other health care providers outside of the [address] [place] [address] since your last visit? Include any pap smears or colon screening. No    There are no preventive care reminders to display for this patient.     Learning Assessment 11/13/2017   PRIMARY LEARNER Patient   HIGHEST LEVEL OF EDUCATION - PRIMARY LEARNER  4 YEARS OF COLLEGE   BARRIERS PRIMARY LEARNER NONE   CO-LEARNER CAREGIVER No   PRIMARY LANGUAGE ENGLISH   LEARNER PREFERENCE PRIMARY VIDEOS   ANSWERED BY [patient   RELATIONSHIP SELF

## 2018-05-12 LAB
ALBUMIN SERPL-MCNC: 4.4 G/DL (ref 3.6–4.8)
ALBUMIN/GLOB SERPL: 1.4 {RATIO} (ref 1.2–2.2)
ALP SERPL-CCNC: 109 IU/L (ref 39–117)
ALT SERPL-CCNC: 12 IU/L (ref 0–44)
AST SERPL-CCNC: 11 IU/L (ref 0–40)
BASOPHILS # BLD AUTO: 0 X10E3/UL (ref 0–0.2)
BASOPHILS NFR BLD AUTO: 0 %
BILIRUB SERPL-MCNC: 0.3 MG/DL (ref 0–1.2)
BUN SERPL-MCNC: 19 MG/DL (ref 8–27)
BUN/CREAT SERPL: 20 (ref 10–24)
CALCIUM SERPL-MCNC: 9.3 MG/DL (ref 8.6–10.2)
CHLORIDE SERPL-SCNC: 99 MMOL/L (ref 96–106)
CHOLEST SERPL-MCNC: 147 MG/DL (ref 100–199)
CK SERPL-CCNC: 132 U/L (ref 24–204)
CO2 SERPL-SCNC: 25 MMOL/L (ref 18–29)
CREAT SERPL-MCNC: 0.94 MG/DL (ref 0.76–1.27)
EOSINOPHIL # BLD AUTO: 0.2 X10E3/UL (ref 0–0.4)
EOSINOPHIL NFR BLD AUTO: 2 %
ERYTHROCYTE [DISTWIDTH] IN BLOOD BY AUTOMATED COUNT: 13.5 % (ref 12.3–15.4)
GLOBULIN SER CALC-MCNC: 3.2 G/DL (ref 1.5–4.5)
GLUCOSE SERPL-MCNC: 99 MG/DL (ref 65–99)
HCT VFR BLD AUTO: 40.5 % (ref 37.5–51)
HDLC SERPL-MCNC: 31 MG/DL
HGB BLD-MCNC: 13.4 G/DL (ref 13–17.7)
IMM GRANULOCYTES # BLD: 0 X10E3/UL (ref 0–0.1)
IMM GRANULOCYTES NFR BLD: 0 %
LDLC SERPL CALC-MCNC: 89 MG/DL (ref 0–99)
LYMPHOCYTES # BLD AUTO: 2.5 X10E3/UL (ref 0.7–3.1)
LYMPHOCYTES NFR BLD AUTO: 26 %
MCH RBC QN AUTO: 30.9 PG (ref 26.6–33)
MCHC RBC AUTO-ENTMCNC: 33.1 G/DL (ref 31.5–35.7)
MCV RBC AUTO: 94 FL (ref 79–97)
MONOCYTES # BLD AUTO: 0.8 X10E3/UL (ref 0.1–0.9)
MONOCYTES NFR BLD AUTO: 9 %
NEUTROPHILS # BLD AUTO: 6.1 X10E3/UL (ref 1.4–7)
NEUTROPHILS NFR BLD AUTO: 63 %
PLATELET # BLD AUTO: 284 X10E3/UL (ref 150–379)
POTASSIUM SERPL-SCNC: 4.9 MMOL/L (ref 3.5–5.2)
PROT SERPL-MCNC: 7.6 G/DL (ref 6–8.5)
RBC # BLD AUTO: 4.33 X10E6/UL (ref 4.14–5.8)
SODIUM SERPL-SCNC: 139 MMOL/L (ref 134–144)
TRIGL SERPL-MCNC: 134 MG/DL (ref 0–149)
VALPROATE SERPL-MCNC: 55 UG/ML (ref 50–100)
VLDLC SERPL CALC-MCNC: 27 MG/DL (ref 5–40)
WBC # BLD AUTO: 9.6 X10E3/UL (ref 3.4–10.8)

## 2018-08-31 DIAGNOSIS — E78.5 DYSLIPIDEMIA: Primary | ICD-10-CM

## 2018-08-31 RX ORDER — ROSUVASTATIN CALCIUM 20 MG/1
20 TABLET, COATED ORAL
Qty: 90 TAB | Refills: 3 | Status: SHIPPED | OUTPATIENT
Start: 2018-08-31 | End: 2019-08-26 | Stop reason: SDUPTHER

## 2018-09-20 ENCOUNTER — OFFICE VISIT (OUTPATIENT)
Dept: BEHAVIORAL/MENTAL HEALTH CLINIC | Age: 63
End: 2018-09-20

## 2018-09-20 VITALS
HEIGHT: 68 IN | DIASTOLIC BLOOD PRESSURE: 80 MMHG | SYSTOLIC BLOOD PRESSURE: 140 MMHG | WEIGHT: 226 LBS | HEART RATE: 108 BPM | BODY MASS INDEX: 34.25 KG/M2

## 2018-09-20 DIAGNOSIS — F41.9 ANXIETY DISORDER, UNSPECIFIED TYPE: ICD-10-CM

## 2018-09-20 DIAGNOSIS — F31.70 BIPOLAR DISORDER IN REMISSION (HCC): Primary | ICD-10-CM

## 2018-09-20 RX ORDER — SERTRALINE HYDROCHLORIDE 50 MG/1
TABLET, FILM COATED ORAL
Qty: 90 TAB | Refills: 1 | Status: SHIPPED | OUTPATIENT
Start: 2018-09-20 | End: 2019-04-01 | Stop reason: SDUPTHER

## 2018-09-20 RX ORDER — DIVALPROEX SODIUM 250 MG/1
TABLET, DELAYED RELEASE ORAL
Qty: 180 TAB | Refills: 1 | Status: SHIPPED | OUTPATIENT
Start: 2018-09-20 | End: 2019-04-01 | Stop reason: SDUPTHER

## 2018-09-20 NOTE — PROGRESS NOTES
Jemima Faulkner  1955  59 y.o.  male  [facility]    Chief Complaint   Patient presents with    Anxiety    Depression       [place]:   This is a 59years old   male with past psychiatric history and treatment of bipolar disorder who was patient of Dr. Tevin Lopez from 2013 until 2018 when he was prescribed Depakote 250 mg twice a day and Zoloft 50 mg daily. He presented on time, was alert awake oriented to time person and place and was calm and cooperative, polite, and pleasant during the interview. He was able to provide pertinent history and was able to discuss treatment alternatives and decide about the plan. Patient was in his usual state of health until when he was 7years old and his mother  and he went through a bout of depression but did not receive any treatment. He drink and abuse marijuana heavily and was diagnosed and treated in 47s when he was residing in [place]. He was a started on Zoloft and Depakote at that time which is a still taking and report satisfaction with his current treatment plan and requested to continue and return in 6 months for reevaluation. Patient denies any current depression and his geriatric depression scale is 0 and negative. His Garza anxiety rating scale is also 0 and negative. His mood disorder questionnaire is positive but he is not able to provide any diagnostic criteria for manic or hypomanic episode he does report couple of days of heightened energy with decreased need for sleep and increasing goal-directed activity. Patient was provided with support and psychoeducation, and after discussing risk/benefits/expectations with treatment alternatives available, patient decided to continue current treatment plan and return in 6 months for reevaluation. He denied any current substance abuse and denies any symptoms of psychosis, PTSD, or OCD.   He does have extensive history of bipolar disorder in his family with multiple suicides attributed to untreated mental illness until his father was diagnosed and treated for bipolar disorder. PAST PSYCHIATRIC HISTORY:  Patient denies any acute inpatient psychiatric hospitalization, any substance abuse detox or rehab, and denies any suicide attempt ever. FAMILY HISTORY:  Any of first degree relatives including biological parents, siblings, first cousins on both sides, uncle/aunt on both sides, and grand parents on both sides have been diagnosed or treated for any mental illness, substance abuse or attempted/commited suicide. Bipolar disorder runs in father side of the family, multiple suicidal deaths in the family including [de-identified], first cousin's son, another cousin's child. SUBSTANCE USE HISTORY:   TOBACCO: Smoke half a pack per day for about 4 years and quit his smoking in 1980s. ALCOHOL: Drink heavy and regularly during college and quit drinking in 1980s. Per his records he has been a heavy alcohol drinker, quit 7 yrs ago, went through bout of depression and started drinking again from March to August of this yr, drank about 1/2 pint of Vodka daily, currently not drinking, denies ever attending rehabs, AA, or any other SA programs. Denies using drugs, used to smoke cigarettes. CANNABIS: Smoke marijuana for 4 years and quit in 1980s. COCAINE, OPIOIDS, and OTHERS: Patient denies    MEDICAL HISTORY:    has a past medical history of Asthma; Atherosclerosis of aorta (HCC); BPH (benign prostatic hypertrophy) (8/23/2012); Cataract; Cholelithiasis; DDD (degenerative disc disease), lumbar; Diverticulitis of sigmoid colon; Diverticulosis of colon; Diverticulosis of sigmoid; Dyslipidemia (2/3/2012); Esophageal ring; Gastritis; GERD (gastroesophageal reflux disease) (2/8/2011); Glaucoma; Hemorrhoids, external (2/8/2011); Herpes simplex of eye; History of colonoscopy (12/13/2017); HTN (hypertension); IGT (impaired glucose tolerance);  Iron deficiency anemia (9/7/2011); LVH (left ventricular hypertrophy); Renal cyst; Stricture of colon ([address] and Subluxation of right lens. ALLERGIES:   Allergies   Allergen Reactions    Hydrochlorothiazide Other (comments)     hyponatremia       VITAL SIGNS:  /80  Pulse (!) 108  Ht 5' 8\" (1.727 m)  Wt 102.5 kg (226 lb)  BMI 34.36 kg/m2    Current Outpatient Prescriptions   Medication Sig Dispense Refill    divalproex DR (DEPAKOTE) 250 mg tablet TAKE 1 TABLET TWICE A DAY (BIPOLAR DISORDER IN REMISSION) 180 Tab 1    sertraline (ZOLOFT) 50 mg tablet TAKE 1 TABLET DAILY FOR ANXIETY WITH DEPRESSION 90 Tab 1    rosuvastatin (CRESTOR) 20 mg tablet Take 1 Tab by mouth nightly. 90 Tab 3    lisinopril (PRINIVIL, ZESTRIL) 40 mg tablet TAKE 1/2 TABLET BY MOUTH TWICE DAILY. 90 Tab 3    omeprazole (PRILOSEC) 20 mg capsule TAKE ONE CAPSULE BY MOUTH ONE TIME DAILY 90 Cap 3    amLODIPine (NORVASC) 10 mg tablet TAKE ONE TABLET BY MOUTH ONE TIME DAILY 90 Tab 3    beclomethasone (QVAR) 80 mcg/actuation aero Take 2 Puffs by inhalation two (2) times a day. 3 Inhaler 3    montelukast (SINGULAIR) 10 mg tablet Take 1 Tab by mouth daily. 90 Tab 3    PROAIR HFA 90 mcg/actuation inhaler USE 2 INHALATIONS EVERY 4 HOURS AS NEEDED FOR WHEEZING 3 Inhaler 3    loratadine (CLARITIN) 10 mg tablet TAKE ONE TABLET BY MOUTH ONE TIME DAILY 30 Tab 11    tamsulosin (FLOMAX) 0.4 mg capsule Take 1 Cap by mouth daily. 90 Cap 3    acyclovir (ZOVIRAX) 200 mg capsule Take  by mouth every four (4) hours (while awake).          ROS:  Constitutional: Positive for fatigue and weight gain  Eyes: Negative for contacts/classes  ENT: Negative for hearing loss and tinnitus  Respiratory: Negative for cough or wheezing  Cardiovascular: Negative for chest pain, palpitations  Gastrointestinal: Negative for reflux symptoms and constipation  Genitourinary: Negative for frequency and urinary incontinence  Musculoskeletal: Negative for myalgias and arthralgias  Neurological: Positive for memory problems  Behavioral/psych: Positive for insomnia, anxiety, depression, negative for SI/HI  Endocrine: Negative for diabetic symptoms including polyuria and polydipsia      LEGAL HISTORY:  Denies    SOCIAL HISTORY:  Patient was born and raised in [place]. History of childhood abuse: Denies. Education: Bachelor's in finance.  history: None. Marriage and children:  for 30 some years and have 1 daughter. Oriental orthodox/Sprituality: Confucianism. He grew up in [place], [place], moved to [place] for about 8 years when he worked in [employer]. His brother who was in the [place] was living in [place]. He came back to [place], attended [employer], could not complete the college, worked as a  for about 25 years, currently retired for 7 years, inherited money from her sister's will,  once, has one daughter who finished college at [employer] and now is working in [address] [place] [address]. MENTAL STATUS EXAMINATION:   Patient is a 59 y.o. male [facility] who looks slightly older than his stated age. Patient appearance is appropriately dressed with good hygiene. Speech is regular rate and rhythm, fluent language, and thought process is linear, logical, and goal directed. Reported mood is great with mood congruent euthymic affect. Patient denies any suicidal ideation, homicidal ideation, and auditory visual hallucination. Not observed to be delusional or paranoid. Insight, judgement, reliability and impulse control is intact. Cognition was within normal limit and patient was observed to be reliable. DIAGNOSIS:  Encounter Diagnoses   Name Primary?  Bipolar disorder in remission ([facility] Yes    Anxiety disorder, unspecified type        PLAN:  1.  MEDICATION: Patient report compliance with Depakote 250 mg twice a day and Zoloft 50 mg daily, is able to tolerate, and requested to continue current treatment plan and return in 6 months for reevaluation as he is in the same medication for past 30 some years. Patient was provided with psycho education, discussed risk/benefits, and expectations from medication changes. Patient agrees with plan. 2. PSYCHOTHERAPY: Patient was provided with supportive therapy, strongly encourage to seek psychotherapy. 3. MEDICAL CARE: Patient was strongly encourage to take their medical medications and follow up with their PCP on regular basis. His current weight is 226 pounds and it is a stable since past 5 months. His last Depakote level is 55 in May of this year. 4. SUBSTANCE ABUSE CARE: Patient quit his smoking, quit drinking, quit his smoking marijuana in 1980.    5. FOLLOW UP:   Follow-up Disposition:  Return in about 6 months (around 3/20/2019) for Medication management. Patient was seen for 45 minutes face-to-face and more than half of the time spent in counseling.

## 2018-09-20 NOTE — MR AVS SNAPSHOT
[address] [facility] 
[phone] Patient: Shane Beebe MRN: [id number] TLL:9/03/2042 Visit Information Date & Time Provider Department Dept. Phone Encounter #  
 9/20/2018  2:00 PM Jay Bermudez MD [address] [facility] [phone] [id number] Your Appointments 11/12/2018  9:30 AM  
ROUTINE CARE with Dorcas Chin MD  
[facility] Pediatrics and Internal Medicine [facility] Appt Note: cholestrol, pre-diabetes [address] [facility] [address] [facility] [address]  
[address] [facility] [address] Upcoming Health Maintenance Date Due Influenza Age 5 to Adult 8/1/2018 DTaP/Tdap/Td series (2 - Td) 2/8/2021 COLONOSCOPY 12/13/2022 Allergies as of 9/20/2018  Review Complete On: 9/20/2018 By: Seble Zuniga Severity Noted Reaction Type Reactions Hydrochlorothiazide  04/19/2016    Other (comments)  
 hyponatremia Current Immunizations  Reviewed on 5/11/2018 Name Date Influenza Vaccine 10/8/2017, 10/10/2016, 9/26/2013 Influenza Vaccine (Quad) PF 10/12/2015, 10/13/2014 Influenza Vaccine Whole 9/26/2012 Pneumococcal Polysaccharide (PPSV-23) 5/18/2015 TDAP Vaccine 2/8/2011 Zoster Vaccine, Live 5/7/2013 Not reviewed this visit Vitals BP Pulse Height(growth percentile) Weight(growth percentile) BMI Smoking Status 140/80 (!) 108 5' 8\" (1.727 m) 226 lb (102.5 kg) 34.36 kg/m2 Former Smoker BMI and BSA Data Body Mass Index Body Surface Area  
 34.36 kg/m 2 2.22 m 2 Preferred Pharmacy Pharmacy Name Phone [facility], [facility] [phone] Your Updated Medication List  
  
   
This list is accurate as of 9/20/18  2:29 PM.  Always use your most recent med list.  
  
  
  
  
 acyclovir 200 mg capsule Commonly known as:  ZOVIRAX Take  by mouth every four (4) hours (while awake). amLODIPine 10 mg tablet Commonly known as:  Hiwot Yaritza TAKE ONE TABLET BY MOUTH ONE TIME DAILY  
  
 beclomethasone 80 mcg/actuation Aero Commonly known as:  QVAR Take 2 Puffs by inhalation two (2) times a day. divalproex  mg tablet Commonly known as:  DEPAKOTE  
TAKE 1 TABLET TWICE A DAY (BIPOLAR DISORDER IN REMISSION)  
  
 lisinopril 40 mg tablet Commonly known as:  PRINIVIL, ZESTRIL  
TAKE 1/2 TABLET BY MOUTH TWICE DAILY. loratadine 10 mg tablet Commonly known as:  CLARITIN  
TAKE ONE TABLET BY MOUTH ONE TIME DAILY  
  
 montelukast 10 mg tablet Commonly known as:  SINGULAIR Take 1 Tab by mouth daily. omeprazole 20 mg capsule Commonly known as:  PRILOSEC  
TAKE ONE CAPSULE BY MOUTH ONE TIME DAILY PROAIR HFA 90 mcg/actuation inhaler Generic drug:  albuterol USE 2 INHALATIONS EVERY 4 HOURS AS NEEDED FOR WHEEZING  
  
 rosuvastatin 20 mg tablet Commonly known as:  CRESTOR Take 1 Tab by mouth nightly. sertraline 50 mg tablet Commonly known as:  ZOLOFT  
TAKE 1 TABLET DAILY FOR ANXIETY WITH DEPRESSION  
  
 tamsulosin 0.4 mg capsule Commonly known as:  FLOMAX Take 1 Cap by mouth daily. Prescriptions Sent to Pharmacy Refills  
 divalproex DR (DEPAKOTE) 250 mg tablet 1 Sig: TAKE 1 TABLET TWICE A DAY (BIPOLAR DISORDER IN REMISSION) Class: Normal  
 Pharmacy: [address] Ph #: [phone]  
 sertraline (ZOLOFT) 50 mg tablet 1 Sig: TAKE 1 TABLET DAILY FOR ANXIETY WITH DEPRESSION Class: Normal  
 Pharmacy: [address] [place] [address] Ph #: [phone] [facility]! Dear Raisa Jiménez: Thank you for requesting a [employer] account.   Our records indicate that you already have an active [employer] account. You can access your account anytime at [email or web address] Did you know that you can access your hospital and ER discharge instructions at any time in [employer]? You can also review all of your test results from your hospital stay or ER visit. Additional Information If you have questions, please visit the Frequently Asked Questions section of the [employer] website at [email or web address] Remember, [employer] is NOT to be used for urgent needs. For medical emergencies, dial 911. Now available from your iPhone and Android! Please provide this summary of care documentation to your next provider. Your primary care clinician is listed as [address] If you have any questions after today's visit, please call [phone].

## 2018-09-27 RX ORDER — LORATADINE 10 MG/1
TABLET ORAL
Qty: 30 TAB | Refills: 11 | Status: SHIPPED | OUTPATIENT
Start: 2018-09-27 | End: 2019-10-10 | Stop reason: ALTCHOICE

## 2018-11-12 ENCOUNTER — OFFICE VISIT (OUTPATIENT)
Dept: INTERNAL MEDICINE CLINIC | Age: 63
End: 2018-11-12

## 2018-11-12 VITALS
BODY MASS INDEX: 34.31 KG/M2 | SYSTOLIC BLOOD PRESSURE: 130 MMHG | RESPIRATION RATE: 16 BRPM | HEART RATE: 93 BPM | WEIGHT: 226.4 LBS | TEMPERATURE: 98 F | DIASTOLIC BLOOD PRESSURE: 83 MMHG | HEIGHT: 68 IN | OXYGEN SATURATION: 93 %

## 2018-11-12 DIAGNOSIS — B00.50 HERPES SIMPLEX OF EYE: ICD-10-CM

## 2018-11-12 DIAGNOSIS — K56.699 STRICTURE OF COLON (HCC): ICD-10-CM

## 2018-11-12 DIAGNOSIS — K21.9 GASTROESOPHAGEAL REFLUX DISEASE WITHOUT ESOPHAGITIS: ICD-10-CM

## 2018-11-12 DIAGNOSIS — E55.9 VITAMIN D DEFICIENCY: ICD-10-CM

## 2018-11-12 DIAGNOSIS — E78.5 DYSLIPIDEMIA: ICD-10-CM

## 2018-11-12 DIAGNOSIS — F31.30 BIPOLAR DISORDER, MOST RECENT EPISODE DEPRESSED (HCC): ICD-10-CM

## 2018-11-12 DIAGNOSIS — N40.1 BENIGN PROSTATIC HYPERPLASIA WITH NOCTURIA: ICD-10-CM

## 2018-11-12 DIAGNOSIS — R73.02 IGT (IMPAIRED GLUCOSE TOLERANCE): ICD-10-CM

## 2018-11-12 DIAGNOSIS — J45.909 UNCOMPLICATED ASTHMA, UNSPECIFIED ASTHMA SEVERITY, UNSPECIFIED WHETHER PERSISTENT: ICD-10-CM

## 2018-11-12 DIAGNOSIS — I10 ESSENTIAL HYPERTENSION: Primary | ICD-10-CM

## 2018-11-12 DIAGNOSIS — R35.1 BENIGN PROSTATIC HYPERPLASIA WITH NOCTURIA: ICD-10-CM

## 2018-11-12 LAB — HBA1C MFR BLD HPLC: 5.8 % (ref 4.8–5.6)

## 2018-11-12 NOTE — PROGRESS NOTES
HPI: 
Presents for f/u lipids, IGT, etc 
 
Pt reports raking leaves over the weekend May have increased resp sx mildly On singulair and Qvar Using albuterol TID - more often in the Fall than at other times Pt saw new psych recently Reviewed weight gain and depakote. But, since mood has been well controlled, they decided to keep dosing the same and same meds. On crestor now for 2 months or so Saw urology a month or so ago PSA stable around 1.0 Past medical, Social, and Family history reviewed Prior to Admission medications Medication Sig Start Date End Date Taking? Authorizing Provider  
loratadine (CLARITIN) 10 mg tablet TAKE ONE TABLET BY MOUTH ONE TIME DAILY 9/27/18  Yes Aly Max MD  
divalproex DR (DEPAKOTE) 250 mg tablet TAKE 1 TABLET TWICE A DAY (BIPOLAR DISORDER IN REMISSION) 9/20/18  Yes Karl Holt MD  
sertraline (ZOLOFT) 50 mg tablet TAKE 1 TABLET DAILY FOR ANXIETY WITH DEPRESSION 9/20/18  Yes Karl Holt MD  
rosuvastatin (CRESTOR) 20 mg tablet Take 1 Tab by mouth nightly. 8/31/18  Yes Aly Max MD  
lisinopril (PRINIVIL, ZESTRIL) 40 mg tablet TAKE 1/2 TABLET BY MOUTH TWICE DAILY. 5/11/18  Yes Aly Max MD  
omeprazole (PRILOSEC) 20 mg capsule TAKE ONE CAPSULE BY MOUTH ONE TIME DAILY 5/11/18  Yes Aly Max MD  
amLODIPine (NORVASC) 10 mg tablet TAKE ONE TABLET BY MOUTH ONE TIME DAILY 5/11/18  Yes Aly Max MD  
beclomethasone (QVAR) 80 mcg/actuation aero Take 2 Puffs by inhalation two (2) times a day. 5/11/18  Yes Aly Max MD  
montelukast (SINGULAIR) 10 mg tablet Take 1 Tab by mouth daily. 5/11/18  Yes Aly Max MD  
PROAIR HFA 90 mcg/actuation inhaler USE 2 INHALATIONS EVERY 4 HOURS AS NEEDED FOR WHEEZING 3/15/18  Yes Aly Max MD  
tamsulosin [facility]) 0.4 mg capsule Take 1 Cap by mouth daily.  5/10/17  Yes Aly Max MD  
 acyclovir (ZOVIRAX) 200 mg capsule Take  by mouth every four (4) hours (while awake). Yes Provider, Historical  
  
 
 
ROS Complete ROS reviewed and negative or stable except as noted in HPI. Physical Exam  
Constitutional: He is oriented to person, place, and time. He appears well-nourished. No distress. HENT:  
Head: Normocephalic and atraumatic. Mouth/Throat: Oropharynx is clear and moist.  
Eyes: EOM are normal. Pupils are equal, round, and reactive to light. No scleral icterus. Neck: Normal range of motion. Neck supple. No JVD present. Cardiovascular: Normal rate, regular rhythm and normal heart sounds. Exam reveals no gallop and no friction rub. No murmur heard. Pulmonary/Chest: Effort normal and breath sounds normal. No respiratory distress. He has no wheezes. He has no rales. Prolonged exp phase Abdominal: Soft. He exhibits no distension. There is no tenderness. Musculoskeletal: Normal range of motion. He exhibits no edema. Lymphadenopathy:  
  He has no cervical adenopathy. Neurological: He is alert and oriented to person, place, and time. He exhibits normal muscle tone. Skin: Skin is warm. No rash noted. Psychiatric: He has a normal mood and affect. Nursing note and vitals reviewed. Prior labs reviewed. POC HgbA1C 5.8 Assessment/Plan: ICD-10-CM ICD-9-CM 1. Essential hypertension I10 401.9 CBC WITH AUTOMATED DIFF  
2. IGT (impaired glucose tolerance) R73.02 790.22 AMB POC HEMOGLOBIN A1C  
3. Stricture of colon ([facility] K56.699 560.9 4. 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 054.40   
5. Gastroesophageal reflux disease without esophagitis K21.9 530.81   
6. Dyslipidemia E78.5 272.4 CK  
   LIPID PANEL  
   METABOLIC PANEL, COMPREHENSIVE 7. Bipolar disorder, most recent episode depressed ([facility] F31.30 296.50 VALPROIC ACID 8.  Benign prostatic hyperplasia with nocturia N40.1 600.01   
 R35.1 788.43   
 9. Uncomplicated asthma, unspecified asthma severity, unspecified whether persistent J45.909 493.90   
10. Vitamin D deficiency E55.9 268.9 VITAMIN D, 25 HYDROXY Follow-up Disposition: 
Return in about 6 months (around 5/12/2019), or if symptoms worsen or fail to improve, for cholesterol, asthma. results and schedule of future studies reviewed with patient 
reviewed diet, exercise and weight 
cardiovascular risk and specific lipid/LDL goals reviewed 
reviewed medications and side effects in detail Repeat labs today PFTs offered - pt declined due to potential cost 
Consider spiriva type med Pt to check on cost

## 2018-11-12 NOTE — PATIENT INSTRUCTIONS
Look into cost on Advair, AirDuo (generic advair), Dulera, Symbicort, Breo (once a day advair) - these are like getting Qvar + albuterol together. Also, consider Incruse or Spiriva - these help breathing in a different way - anticholinergic bronchodilator.

## 2018-11-12 NOTE — PROGRESS NOTES
Rm 13 Chief Complaint Patient presents with  Cholesterol Problem  Diabetes Pre-DM 1. Have you been to the ER, urgent care clinic since your last visit? Hospitalized since your last visit? No 
 
2. Have you seen or consulted any other health care providers outside of the [address] [place] [address] since your last visit? Include any pap smears or colon screening. No 
 
Health Maintenance Due Topic Date Due  Shingrix Vaccine Age 50> (1 of 2) 08/11/2006 Learning Assessment 11/12/2018 PRIMARY LEARNER Patient HIGHEST LEVEL OF EDUCATION - PRIMARY LEARNER  4 YEARS OF COLLEGE  
BARRIERS PRIMARY LEARNER NONE  
CO-LEARNER CAREGIVER No  
PRIMARY LANGUAGE ENGLISH  
LEARNER PREFERENCE PRIMARY VIDEOS  
ANSWERED BY patient RELATIONSHIP SELF

## 2018-11-13 DIAGNOSIS — E55.9 VITAMIN D DEFICIENCY: Primary | ICD-10-CM

## 2018-11-13 LAB
25(OH)D3+25(OH)D2 SERPL-MCNC: 10.7 NG/ML (ref 30–100)
ALBUMIN SERPL-MCNC: 4.4 G/DL (ref 3.6–4.8)
ALBUMIN/GLOB SERPL: 1.6 {RATIO} (ref 1.2–2.2)
ALP SERPL-CCNC: 90 IU/L (ref 39–117)
ALT SERPL-CCNC: 17 IU/L (ref 0–44)
AST SERPL-CCNC: 20 IU/L (ref 0–40)
BASOPHILS # BLD AUTO: 0 X10E3/UL (ref 0–0.2)
BASOPHILS NFR BLD AUTO: 0 %
BILIRUB SERPL-MCNC: 0.2 MG/DL (ref 0–1.2)
BUN SERPL-MCNC: 11 MG/DL (ref 8–27)
BUN/CREAT SERPL: 14 (ref 10–24)
CALCIUM SERPL-MCNC: 9 MG/DL (ref 8.6–10.2)
CHLORIDE SERPL-SCNC: 102 MMOL/L (ref 96–106)
CHOLEST SERPL-MCNC: 123 MG/DL (ref 100–199)
CK SERPL-CCNC: 191 U/L (ref 24–204)
CO2 SERPL-SCNC: 25 MMOL/L (ref 20–29)
CREAT SERPL-MCNC: 0.8 MG/DL (ref 0.76–1.27)
EOSINOPHIL # BLD AUTO: 0.1 X10E3/UL (ref 0–0.4)
EOSINOPHIL NFR BLD AUTO: 1 %
ERYTHROCYTE [DISTWIDTH] IN BLOOD BY AUTOMATED COUNT: 13.6 % (ref 12.3–15.4)
GLOBULIN SER CALC-MCNC: 2.7 G/DL (ref 1.5–4.5)
GLUCOSE SERPL-MCNC: 106 MG/DL (ref 65–99)
HCT VFR BLD AUTO: 40.9 % (ref 37.5–51)
HDLC SERPL-MCNC: 31 MG/DL
HGB BLD-MCNC: 13.8 G/DL (ref 13–17.7)
IMM GRANULOCYTES # BLD: 0 X10E3/UL (ref 0–0.1)
IMM GRANULOCYTES NFR BLD: 0 %
LDLC SERPL CALC-MCNC: 63 MG/DL (ref 0–99)
LYMPHOCYTES # BLD AUTO: 1.9 X10E3/UL (ref 0.7–3.1)
LYMPHOCYTES NFR BLD AUTO: 23 %
MCH RBC QN AUTO: 31.4 PG (ref 26.6–33)
MCHC RBC AUTO-ENTMCNC: 33.7 G/DL (ref 31.5–35.7)
MCV RBC AUTO: 93 FL (ref 79–97)
MONOCYTES # BLD AUTO: 0.8 X10E3/UL (ref 0.1–0.9)
MONOCYTES NFR BLD AUTO: 10 %
NEUTROPHILS # BLD AUTO: 5.3 X10E3/UL (ref 1.4–7)
NEUTROPHILS NFR BLD AUTO: 66 %
PLATELET # BLD AUTO: 232 X10E3/UL (ref 150–379)
POTASSIUM SERPL-SCNC: 5 MMOL/L (ref 3.5–5.2)
PROT SERPL-MCNC: 7.1 G/DL (ref 6–8.5)
RBC # BLD AUTO: 4.4 X10E6/UL (ref 4.14–5.8)
SODIUM SERPL-SCNC: 140 MMOL/L (ref 134–144)
TRIGL SERPL-MCNC: 145 MG/DL (ref 0–149)
VALPROATE SERPL-MCNC: 58 UG/ML (ref 50–100)
VLDLC SERPL CALC-MCNC: 29 MG/DL (ref 5–40)
WBC # BLD AUTO: 8.2 X10E3/UL (ref 3.4–10.8)

## 2018-11-13 RX ORDER — ERGOCALCIFEROL 1.25 MG/1
50000 CAPSULE ORAL
Qty: 12 CAP | Refills: 0 | Status: SHIPPED | OUTPATIENT
Start: 2018-11-13 | End: 2019-01-30

## 2018-11-13 RX ORDER — MELATONIN
1000 DAILY
Qty: 90 TAB | Refills: 3 | Status: SHIPPED | OUTPATIENT
Start: 2018-11-13

## 2018-11-14 RX ORDER — ALBUTEROL SULFATE 90 UG/1
AEROSOL, METERED RESPIRATORY (INHALATION)
Qty: 3 INHALER | Refills: 3 | Status: SHIPPED | OUTPATIENT
Start: 2018-11-14 | End: 2019-08-05 | Stop reason: SDUPTHER

## 2019-04-01 ENCOUNTER — OFFICE VISIT (OUTPATIENT)
Dept: BEHAVIORAL/MENTAL HEALTH CLINIC | Age: 64
End: 2019-04-01

## 2019-04-01 VITALS
HEART RATE: 99 BPM | DIASTOLIC BLOOD PRESSURE: 83 MMHG | HEIGHT: 68 IN | SYSTOLIC BLOOD PRESSURE: 159 MMHG | WEIGHT: 238 LBS | BODY MASS INDEX: 36.07 KG/M2

## 2019-04-01 DIAGNOSIS — F31.70 BIPOLAR DISORDER IN REMISSION (HCC): Primary | ICD-10-CM

## 2019-04-01 DIAGNOSIS — F41.9 ANXIETY DISORDER, UNSPECIFIED TYPE: ICD-10-CM

## 2019-04-01 RX ORDER — SERTRALINE HYDROCHLORIDE 50 MG/1
TABLET, FILM COATED ORAL
Qty: 90 TAB | Refills: 1 | Status: SHIPPED | OUTPATIENT
Start: 2019-04-01 | End: 2019-09-09 | Stop reason: SDUPTHER

## 2019-04-01 RX ORDER — DIVALPROEX SODIUM 250 MG/1
TABLET, DELAYED RELEASE ORAL
Qty: 180 TAB | Refills: 1 | Status: SHIPPED | OUTPATIENT
Start: 2019-04-01 | End: 2019-09-09 | Stop reason: SDUPTHER

## 2019-04-01 NOTE — PROGRESS NOTES
Laura Suarez  1955  59 y.o.  male  [facility]    Chief Complaint   Patient presents with    Depression    Anxiety    Insomnia       [place]:   This is a 59years old   male with past psychiatric history and treatment of bipolar disorder who was patient of Dr. Dada Payton from 2013 until 2018 when he was prescribed Depakote 250 mg twice a day and Zoloft 50 mg daily. He was seen for the first time on 2018 when he decided to continue his treatment plan and return in 6 months for reevaluation. Patient presented on time, was alert awake oriented to time person and place and was calm and cooperative, polite, and pleasant during the interview. He report compliance with both of his medication, is able to tolerate, and requested to continue current treatment plan and return in 6 months for reevaluation. He had blood level done for Depakote on 2018 with level of 58 which is low normal.  He also had other blood work done which we discussed in detail.     Patient was in his usual state of health until when he was 7years old and his mother  and he went through a bout of depression but did not receive any treatment. He drink and abuse marijuana heavily and was diagnosed and treated in 47s when he was residing in [place]. He was a started on Zoloft and Depakote at that time which is a still taking and report satisfaction with his current treatment plan and requested to continue and return in 6 months for reevaluation. He denies any depression, anxiety, insomnia at this time.     SUBSTANCE USE HISTORY:   TOBACCO: Smoke half a pack per day for about 4 years and quit his smoking in . ALCOHOL: Drink heavy and regularly during college and quit drinking in .   Per his records he has been a heavy alcohol drinker, quit 7 yrs ago, went through bout of depression and started drinking again from March to August of this yr, drank about 1/2 pint of Vodka daily, currently not drinking, denies ever attending rehabs, AA, or any other SA programs. Denies using drugs, used to smoke cigarettes. CANNABIS: Smoke marijuana for 4 years and quit in 1980s. COCAINE, OPIOIDS, and OTHERS: Patient denies. MEDICAL HISTORY:    has a past medical history of Asthma, Atherosclerosis of aorta ([facility] BPH (benign prostatic hypertrophy) (8/23/2012), Cataract, Cholelithiasis, DDD (degenerative disc disease), lumbar, Diverticulitis of sigmoid colon, Diverticulosis of colon, Diverticulosis of sigmoid, Dyslipidemia (2/3/2012), Esophageal ring, Gastritis, GERD (gastroesophageal reflux disease) (2/8/2011), Glaucoma, Hemorrhoids, external (2/8/2011), Herpes simplex of eye, History of colonoscopy (12/13/2017), HTN (hypertension), IGT (impaired glucose tolerance), Iron deficiency anemia (9/7/2011), LVH (left ventricular hypertrophy), Renal cyst, Stricture of colon ([facility] and Subluxation of right lens. ALLERGIES:   Allergies   Allergen Reactions    Hydrochlorothiazide Other (comments)     hyponatremia       VITAL SIGNS:  /83   Pulse 99   Ht 5' 8\" (1.727 m)   Wt 108 kg (238 lb)   BMI 36.19 kg/m²     Current Outpatient Medications   Medication Sig Dispense Refill    sertraline (ZOLOFT) 50 mg tablet TAKE 1 TABLET DAILY FOR ANXIETY WITH DEPRESSION 90 Tab 1    divalproex DR (DEPAKOTE) 250 mg tablet TAKE 1 TABLET TWICE A DAY (BIPOLAR DISORDER IN REMISSION) 180 Tab 1    PROAIR HFA 90 mcg/actuation inhaler USE 2 INHALATIONS EVERY 4 HOURS AS NEEDED FOR WHEEZING 3 Inhaler 3    cholecalciferol (VITAMIN D3) 1,000 unit tablet Take 1 Tab by mouth daily. 90 Tab 3    loratadine (CLARITIN) 10 mg tablet TAKE ONE TABLET BY MOUTH ONE TIME DAILY 30 Tab 11    rosuvastatin (CRESTOR) 20 mg tablet Take 1 Tab by mouth nightly. 90 Tab 3    lisinopril (PRINIVIL, ZESTRIL) 40 mg tablet TAKE 1/2 TABLET BY MOUTH TWICE DAILY.  90 Tab 3    omeprazole (PRILOSEC) 20 mg capsule TAKE ONE CAPSULE BY MOUTH ONE TIME DAILY 90 Cap 3    amLODIPine (NORVASC) 10 mg tablet TAKE ONE TABLET BY MOUTH ONE TIME DAILY 90 Tab 3    beclomethasone (QVAR) 80 mcg/actuation aero Take 2 Puffs by inhalation two (2) times a day. 3 Inhaler 3    montelukast (SINGULAIR) 10 mg tablet Take 1 Tab by mouth daily. 90 Tab 3    tamsulosin (FLOMAX) 0.4 mg capsule Take 1 Cap by mouth daily. 90 Cap 3    acyclovir (ZOVIRAX) 200 mg capsule Take  by mouth every four (4) hours (while awake). ROS:  Constitutional: Negative for fatigue and weight gain  Eyes: Negative for contacts/glasses  ENT: Negative for hearing loss and tinnitus  Respiratory: Negative for cough or wheezing  Neurological: Negative for memory problems  Behavioral/psych: Positive for insomnia, anxiety, depression, negative for SI/HI  Endocrine: Negative for diabetic symptoms including polyuria and polydipsia      MENTAL STATUS EXAMINATION:   Patient is a 59 y.o. male [facility] who looks slightly older than his stated age. Patient appearance is appropriately dressed with good hygiene. Speech is regular rate and rhythm, fluent language, and thought process is linear, logical, and goal directed. Reported mood is great with mood congruent euthymic affect. Patient denies any suicidal ideation, homicidal ideation, and auditory visual hallucination. Not observed to be delusional or paranoid. Insight, judgement, reliability and impulse control is intact. Cognition was within normal limit and patient was observed to be reliable. DIAGNOSIS:  Encounter Diagnoses   Name Primary?  Bipolar disorder in remission ([facility] Yes    Anxiety disorder, unspecified type        PLAN:  1. MEDICATION:   1. Depakote 250 mg twice a day, level done November 12 is 62 which is low normal.  He is going to have another level done soon. 2. Zoloft 50 mg daily. He will return in 6 months for reevaluation as he is in the same medication for past 30 some years.   He was provided with psycho education, discussed risk/benefits, and expectations from medication changes. Patient agrees with plan.      2. PSYCHOTHERAPY: Patient was provided with supportive therapy, strongly encourage to seek psychotherapy.     3. MEDICAL CARE: Patient was strongly encourage to take their medical medications and follow up with their PCP on regular basis. His current weight is 226 pounds and it is a stable since past 5 months. His last Depakote level is 55 in May of this year.     4. SUBSTANCE ABUSE CARE: Patient quit his smoking, quit drinking, quit his smoking marijuana in 1980.        5. FOLLOW UP:   Follow-up and Dispositions    · Return in about 6 months (around 10/1/2019) for Medication management.

## 2019-05-13 ENCOUNTER — OFFICE VISIT (OUTPATIENT)
Dept: INTERNAL MEDICINE CLINIC | Age: 64
End: 2019-05-13

## 2019-05-13 VITALS
HEIGHT: 68 IN | WEIGHT: 223 LBS | HEART RATE: 77 BPM | SYSTOLIC BLOOD PRESSURE: 122 MMHG | RESPIRATION RATE: 16 BRPM | OXYGEN SATURATION: 94 % | TEMPERATURE: 98.3 F | DIASTOLIC BLOOD PRESSURE: 74 MMHG | BODY MASS INDEX: 33.8 KG/M2

## 2019-05-13 DIAGNOSIS — E78.5 DYSLIPIDEMIA: ICD-10-CM

## 2019-05-13 DIAGNOSIS — E55.9 VITAMIN D DEFICIENCY: ICD-10-CM

## 2019-05-13 DIAGNOSIS — B00.50 HERPES SIMPLEX OF EYE: ICD-10-CM

## 2019-05-13 DIAGNOSIS — Z23 ENCOUNTER FOR IMMUNIZATION: ICD-10-CM

## 2019-05-13 DIAGNOSIS — R73.02 IGT (IMPAIRED GLUCOSE TOLERANCE): ICD-10-CM

## 2019-05-13 DIAGNOSIS — K21.9 GASTROESOPHAGEAL REFLUX DISEASE WITHOUT ESOPHAGITIS: ICD-10-CM

## 2019-05-13 DIAGNOSIS — J45.909 UNCOMPLICATED ASTHMA, UNSPECIFIED ASTHMA SEVERITY, UNSPECIFIED WHETHER PERSISTENT: ICD-10-CM

## 2019-05-13 DIAGNOSIS — I10 ESSENTIAL HYPERTENSION: Primary | ICD-10-CM

## 2019-05-13 RX ORDER — PREDNISOLONE ACETATE 10 MG/ML
1 SUSPENSION/ DROPS OPHTHALMIC DAILY
COMMUNITY

## 2019-05-13 NOTE — PROGRESS NOTES
Rm#13    Prednisone eye drops - one drop in left eye once daily     Chief Complaint   Patient presents with    Cholesterol Problem     f/u. FASTING    Asthma     f/u    Hypertension     f/u     1. Have you been to the ER, urgent care clinic since your last visit? Hospitalized since your last visit? No    2. Have you seen or consulted any other health care providers outside of the [facility] since your last visit? Include any pap smears or colon screening.  [facility]2019     Health Maintenance Due   Topic Date Due    Shingrix Vaccine Age 49> (1 of 2) 08/11/2006     pts wants shingrix today

## 2019-05-13 NOTE — PROGRESS NOTES
HPI:  Presents for f/u lipids, IGT, HTN,   asthma    +tolerance of meds  +med compliance    Pt using Qvar bid  Feeling better with more regular dosing    No CP, SOB, neuro sx    Taking vit D 2000 units per day     Past medical, Social, and Family history reviewed    Prior to Admission medications    Medication Sig Start Date End Date Taking? Authorizing Provider   sertraline (ZOLOFT) 50 mg tablet TAKE 1 TABLET DAILY FOR ANXIETY WITH DEPRESSION 4/1/19  Yes Michael Kirby MD   divalproex DR (DEPAKOTE) 250 mg tablet TAKE 1 TABLET TWICE A DAY (BIPOLAR DISORDER IN REMISSION) 4/1/19  Yes Michael Kirby MD   PROAIR HFA 90 mcg/actuation inhaler USE 2 INHALATIONS EVERY 4 HOURS AS NEEDED FOR WHEEZING 11/14/18  Yes Liza Murray MD   cholecalciferol (VITAMIN D3) 1,000 unit tablet Take 1 Tab by mouth daily. 11/13/18  Yes Liza Murray MD   rosuvastatin (CRESTOR) 20 mg tablet Take 1 Tab by mouth nightly. 8/31/18  Yes Liza Murray MD   lisinopril (PRINIVIL, ZESTRIL) 40 mg tablet TAKE 1/2 TABLET BY MOUTH TWICE DAILY. 5/11/18  Yes Liza Murray MD   omeprazole (PRILOSEC) 20 mg capsule TAKE ONE CAPSULE BY MOUTH ONE TIME DAILY 5/11/18  Yes Liza Murray MD   amLODIPine (NORVASC) 10 mg tablet TAKE ONE TABLET BY MOUTH ONE TIME DAILY 5/11/18  Yes Liza Murray MD   beclomethasone (QVAR) 80 mcg/actuation aero Take 2 Puffs by inhalation two (2) times a day. 5/11/18  Yes Liza Murray MD   tamsulosin (FLOMAX) 0.4 mg capsule Take 1 Cap by mouth daily. 5/10/17  Yes Liza Murray MD   acyclovir (ZOVIRAX) 200 mg capsule Take  by mouth every four (4) hours (while awake). Yes Provider, Historical   loratadine (CLARITIN) 10 mg tablet TAKE ONE TABLET BY MOUTH ONE TIME DAILY 9/27/18   Liza Murray MD   montelukast (SINGULAIR) 10 mg tablet Take 1 Tab by mouth daily. 5/11/18   Liza Murray MD          ROS  Complete ROS reviewed and negative or stable except as noted in HPI.       Physical Exam Constitutional: He is oriented to person, place, and time. He appears well-nourished. No distress. HENT:   Head: Normocephalic and atraumatic. Mouth/Throat: Oropharynx is clear and moist.   Eyes: Pupils are equal, round, and reactive to light. EOM are normal. No scleral icterus. Neck: Normal range of motion. Neck supple. No JVD present. Cardiovascular: Normal rate, regular rhythm and normal heart sounds. Exam reveals no gallop and no friction rub. No murmur heard. Pulmonary/Chest: Effort normal and breath sounds normal. No respiratory distress. He has no wheezes. He has no rales. Prolonged exp phase   Abdominal: Soft. He exhibits no distension. There is no tenderness. Musculoskeletal: Normal range of motion. He exhibits no edema. Lymphadenopathy:     He has no cervical adenopathy. Neurological: He is alert and oriented to person, place, and time. He exhibits normal muscle tone. Skin: Skin is warm. No rash noted. Psychiatric: He has a normal mood and affect. Nursing note and vitals reviewed. Prior labs reviewed. Assessment/Plan:    ICD-10-CM ICD-9-CM    1. Essential hypertension I10 401.9 CBC WITH AUTOMATED DIFF   2. Encounter for immunization Z23 V03.89 ZOSTER VACC RECOMBINANT ADJUVANTED   3. 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 054.40    4. Gastroesophageal reflux disease without esophagitis K21.9 530.81    5. IGT (impaired glucose tolerance) R73.02 790.22 HEMOGLOBIN A1C WITH EAG   6. Dyslipidemia E78.5 272.4 CK      LIPID PANEL      METABOLIC PANEL, COMPREHENSIVE   7. Uncomplicated asthma, unspecified asthma severity, unspecified whether persistent J45.909 493.90    8. Vitamin D deficiency E55.9 268.9 VITAMIN D, 25 HYDROXY     Follow-up and Dispositions    · Return in about 6 months (around 11/13/2019), or if symptoms worsen or fail to improve, for blood pressure, cholesterol and 2 months for Shingrix #2.         results and schedule of future studies reviewed with patient  reviewed diet, exercise and weight  cardiovascular risk and specific lipid/LDL goals reviewed  reviewed medications and side effects in detail  Shingrix #1  Fasting labs

## 2019-05-14 LAB
25(OH)D3+25(OH)D2 SERPL-MCNC: 37.2 NG/ML (ref 30–100)
ALBUMIN SERPL-MCNC: 4.5 G/DL (ref 3.6–4.8)
ALBUMIN/GLOB SERPL: 1.7 {RATIO} (ref 1.2–2.2)
ALP SERPL-CCNC: 84 IU/L (ref 39–117)
ALT SERPL-CCNC: 16 IU/L (ref 0–44)
AST SERPL-CCNC: 15 IU/L (ref 0–40)
BASOPHILS # BLD AUTO: 0 X10E3/UL (ref 0–0.2)
BASOPHILS NFR BLD AUTO: 0 %
BILIRUB SERPL-MCNC: 0.3 MG/DL (ref 0–1.2)
BUN SERPL-MCNC: 16 MG/DL (ref 8–27)
BUN/CREAT SERPL: 16 (ref 10–24)
CALCIUM SERPL-MCNC: 9.6 MG/DL (ref 8.6–10.2)
CHLORIDE SERPL-SCNC: 100 MMOL/L (ref 96–106)
CHOLEST SERPL-MCNC: 117 MG/DL (ref 100–199)
CK SERPL-CCNC: 82 U/L (ref 24–204)
CO2 SERPL-SCNC: 25 MMOL/L (ref 20–29)
CREAT SERPL-MCNC: 0.99 MG/DL (ref 0.76–1.27)
EOSINOPHIL # BLD AUTO: 0.1 X10E3/UL (ref 0–0.4)
EOSINOPHIL NFR BLD AUTO: 1 %
ERYTHROCYTE [DISTWIDTH] IN BLOOD BY AUTOMATED COUNT: 13.3 % (ref 12.3–15.4)
EST. AVERAGE GLUCOSE BLD GHB EST-MCNC: 128 MG/DL
GLOBULIN SER CALC-MCNC: 2.7 G/DL (ref 1.5–4.5)
GLUCOSE SERPL-MCNC: 102 MG/DL (ref 65–99)
HBA1C MFR BLD: 6.1 % (ref 4.8–5.6)
HCT VFR BLD AUTO: 43.7 % (ref 37.5–51)
HDLC SERPL-MCNC: 32 MG/DL
HGB BLD-MCNC: 14.7 G/DL (ref 13–17.7)
IMM GRANULOCYTES # BLD AUTO: 0 X10E3/UL (ref 0–0.1)
IMM GRANULOCYTES NFR BLD AUTO: 0 %
LDLC SERPL CALC-MCNC: 56 MG/DL (ref 0–99)
LYMPHOCYTES # BLD AUTO: 2.4 X10E3/UL (ref 0.7–3.1)
LYMPHOCYTES NFR BLD AUTO: 26 %
MCH RBC QN AUTO: 31.5 PG (ref 26.6–33)
MCHC RBC AUTO-ENTMCNC: 33.6 G/DL (ref 31.5–35.7)
MCV RBC AUTO: 94 FL (ref 79–97)
MONOCYTES # BLD AUTO: 0.8 X10E3/UL (ref 0.1–0.9)
MONOCYTES NFR BLD AUTO: 9 %
NEUTROPHILS # BLD AUTO: 6 X10E3/UL (ref 1.4–7)
NEUTROPHILS NFR BLD AUTO: 64 %
PLATELET # BLD AUTO: 251 X10E3/UL (ref 150–379)
POTASSIUM SERPL-SCNC: 5 MMOL/L (ref 3.5–5.2)
PROT SERPL-MCNC: 7.2 G/DL (ref 6–8.5)
RBC # BLD AUTO: 4.66 X10E6/UL (ref 4.14–5.8)
SODIUM SERPL-SCNC: 139 MMOL/L (ref 134–144)
TRIGL SERPL-MCNC: 147 MG/DL (ref 0–149)
VLDLC SERPL CALC-MCNC: 29 MG/DL (ref 5–40)
WBC # BLD AUTO: 9.3 X10E3/UL (ref 3.4–10.8)

## 2019-05-15 NOTE — PROGRESS NOTES
HgbA1C is up a little to 6.1 Other labs are all normal or stable and at goal. 
Keep up the good work!   
Continue your current medications

## 2019-05-17 DIAGNOSIS — I10 ESSENTIAL HYPERTENSION WITH GOAL BLOOD PRESSURE LESS THAN 140/90: ICD-10-CM

## 2019-05-17 RX ORDER — LISINOPRIL 40 MG/1
TABLET ORAL
Qty: 90 TAB | Refills: 3 | Status: SHIPPED | OUTPATIENT
Start: 2019-05-17 | End: 2020-05-13 | Stop reason: SDUPTHER

## 2019-05-17 RX ORDER — AMLODIPINE BESYLATE 10 MG/1
TABLET ORAL
Qty: 90 TAB | Refills: 3 | Status: SHIPPED | OUTPATIENT
Start: 2019-05-17 | End: 2020-05-13 | Stop reason: SDUPTHER

## 2019-05-28 RX ORDER — OMEPRAZOLE 20 MG/1
CAPSULE, DELAYED RELEASE ORAL
Qty: 90 CAP | Refills: 3 | Status: SHIPPED | OUTPATIENT
Start: 2019-05-28 | End: 2020-05-13 | Stop reason: SDUPTHER

## 2019-07-24 ENCOUNTER — CLINICAL SUPPORT (OUTPATIENT)
Dept: INTERNAL MEDICINE CLINIC | Age: 64
End: 2019-07-24

## 2019-07-24 VITALS
WEIGHT: 169.5 LBS | SYSTOLIC BLOOD PRESSURE: 148 MMHG | RESPIRATION RATE: 16 BRPM | TEMPERATURE: 98.1 F | BODY MASS INDEX: 25.69 KG/M2 | DIASTOLIC BLOOD PRESSURE: 90 MMHG | OXYGEN SATURATION: 94 % | HEIGHT: 68 IN | HEART RATE: 100 BPM

## 2019-07-24 DIAGNOSIS — Z23 ENCOUNTER FOR IMMUNIZATION: Primary | ICD-10-CM

## 2019-07-24 NOTE — PROGRESS NOTES
RM 16    Chief Complaint   Patient presents with    Immunization/Injection     Shingrix     Immunization/s administered 7/24/2019 by Glendell Hammans, LPN with guardian's consent. Patient tolerated procedure well. No reactions noted. VIS provided. [facility] -new pharmacy from [facility]. Writer notified MD of elevated bp readings. Writer advised patient to schedule appt for 3-4 weeks from today. Patient refused to schedule follow up appt. Stating he will just keep his upcoming appt in Nov. Writer advised patient to call office to schedule appt if any bp concerns come up. Patient voices understanding and agrees. No distress noted prior to leaving office.

## 2019-07-24 NOTE — PROGRESS NOTES
Scheduled for immunization-Shingrix #2. Immunization History   Administered Date(s) Administered    Influenza Vaccine 09/26/2013, 10/10/2016, 10/08/2017, 09/28/2018    Influenza Vaccine (Quad) PF 10/13/2014, 10/12/2015    Influenza Vaccine Whole 09/26/2012    Pneumococcal Polysaccharide (PPSV-23) 05/18/2015    TDAP Vaccine 02/08/2011    Zoster Recombinant 05/13/2019    Zoster Vaccine, Live 05/07/2013     Vitals:    07/24/19 1524 07/24/19 1538   BP: (!) 161/91 148/90   Pulse: (!) 101 100   Resp: 16    Temp: 98.1 °F (36.7 °C)    TempSrc: Oral    SpO2: 94%    Weight: 169 lb 8 oz (76.9 kg)    Height: 5' 8\" (1.727 m)      Future Appointments   Date Time Provider [facility]   10/1/2019  1:00 PM Cosimo Goltz, MD [address] [place] [address]   11/13/2019  9:30 AM Kenyatta Marks MD [facility] SCHED       BP Readings from Last 3 Encounters:   07/24/19 148/90   05/13/19 122/74   04/01/19 159/83       Reviewed BP trends. Recommended repeat BP/follow-up visit here in 3wks. Diagnoses and all orders for this visit:    1.  Encounter for immunization  -     ZOSTER VACC RECOMBINANT ADJUVANTED

## 2019-08-06 RX ORDER — ALBUTEROL SULFATE 90 UG/1
2 AEROSOL, METERED RESPIRATORY (INHALATION)
Qty: 3 INHALER | Refills: 3 | Status: SHIPPED | OUTPATIENT
Start: 2019-08-06 | End: 2020-08-17 | Stop reason: SDUPTHER

## 2019-08-09 NOTE — TELEPHONE ENCOUNTER
beclomethasone (QVAR) 80 mcg/actuation aero    This med is no longer available. Janet Ronan is available.  Please send Rx for Redi-mary grace    Can call new Rx to pharmacy OR e-scribe new Rx    #: [phone] Ref#: [id number]

## 2019-08-12 ENCOUNTER — TELEPHONE (OUTPATIENT)
Dept: INTERNAL MEDICINE CLINIC | Age: 64
End: 2019-08-12

## 2019-08-12 DIAGNOSIS — J45.40 MODERATE PERSISTENT ASTHMA WITHOUT COMPLICATION: Primary | ICD-10-CM

## 2019-08-12 NOTE — TELEPHONE ENCOUNTER
Received fax stating Qvar 80 mcg inh is mfr discontinued. Requesting authorization for Qvar redihal inh 80 mcg.    E rx written and sent to Han Connelly MD

## 2019-08-26 DIAGNOSIS — E78.5 DYSLIPIDEMIA: ICD-10-CM

## 2019-08-26 RX ORDER — ROSUVASTATIN CALCIUM 20 MG/1
20 TABLET, COATED ORAL
Qty: 90 TAB | Refills: 3 | Status: SHIPPED | OUTPATIENT
Start: 2019-08-26 | End: 2020-08-07

## 2019-09-10 RX ORDER — SERTRALINE HYDROCHLORIDE 50 MG/1
TABLET, FILM COATED ORAL
Qty: 90 TAB | Refills: 1 | Status: SHIPPED | OUTPATIENT
Start: 2019-09-10 | End: 2019-10-10 | Stop reason: SDUPTHER

## 2019-09-10 RX ORDER — DIVALPROEX SODIUM 250 MG/1
TABLET, DELAYED RELEASE ORAL
Qty: 180 TAB | Refills: 1 | Status: SHIPPED | OUTPATIENT
Start: 2019-09-10 | End: 2019-10-10 | Stop reason: SDUPTHER

## 2019-10-10 ENCOUNTER — OFFICE VISIT (OUTPATIENT)
Dept: BEHAVIORAL/MENTAL HEALTH CLINIC | Age: 64
End: 2019-10-10

## 2019-10-10 VITALS
SYSTOLIC BLOOD PRESSURE: 148 MMHG | HEART RATE: 100 BPM | WEIGHT: 224 LBS | BODY MASS INDEX: 33.95 KG/M2 | HEIGHT: 68 IN | DIASTOLIC BLOOD PRESSURE: 73 MMHG

## 2019-10-10 DIAGNOSIS — F31.70 BIPOLAR DISORDER IN REMISSION (HCC): Primary | ICD-10-CM

## 2019-10-10 DIAGNOSIS — F41.9 ANXIETY DISORDER, UNSPECIFIED TYPE: ICD-10-CM

## 2019-10-10 RX ORDER — DIVALPROEX SODIUM 250 MG/1
TABLET, DELAYED RELEASE ORAL
Qty: 180 TAB | Refills: 1 | Status: SHIPPED | OUTPATIENT
Start: 2019-10-10 | End: 2020-03-25 | Stop reason: SDUPTHER

## 2019-10-10 RX ORDER — SERTRALINE HYDROCHLORIDE 50 MG/1
TABLET, FILM COATED ORAL
Qty: 90 TAB | Refills: 1 | Status: SHIPPED | OUTPATIENT
Start: 2019-10-10 | End: 2020-03-25 | Stop reason: SDUPTHER

## 2019-10-10 NOTE — PROGRESS NOTES
Mil Monroylo  1955  62 y.o.  male  [facility]    Chief Complaint   Patient presents with    Depression     Patient denies depression on current medication    Bipolar     Denies any manic or hypomanic symptoms on low-dose of Depakote    Insomnia     Sleep well       [place]:   This is a 62 years old   male with past psychiatric history and treatment of bipolar disorder who was patient of Dr. Anh Martel from 2013 until 2018 when he was prescribed Depakote 250 mg twice a day and Zoloft 50 mg daily. He was seen for the first time on 2018 when he decided to continue his treatment plan and return in 6 months for reevaluation.       Patient was seen last time on 2019 when he decided to continue his current treatment plan and return in 6 months for evaluation. He presented on time, was alert awake oriented to time person and place and was calm and cooperative, polite, and pleasant during the interview. He report compliance with both of his medication, is able to tolerate, and requested to continue current treatment plan and return in 6 months for reevaluation. He had blood level done for Depakote on 2018 with level of 58 which is low normal. He also had other blood work done which we discussed in detail.     Patient was in his usual state of health until when he was 7years old and his mother  and he went through a bout of depression but did not receive any treatment.  He drink and abuse marijuana heavily and was diagnosed and treated in 47s when he was residing in [facility] was a started on Zoloft and Depakote at that time which is a still taking and report satisfaction with his current treatment plan and requested to continue and return in 6 months for reevaluation.   He denies any depression, anxiety, insomnia at this time.     SUBSTANCE USE HISTORY:   TOBACCO: Smoke half a pack per day for about 4 years and quit his smoking in 47s.     ALCOHOL: Drink heavy and regularly during college and quit drinking in 1980s.  Per his records he has been a heavy alcohol drinker, quit 7 yrs ago, went through bout of depression and started drinking again from March to August of this yr, drank about 1/2 pint of Vodka daily, currently not drinking, denies ever attending rehabs, AA, or any other SA programs. Denies using drugs, used to smoke cigarettes.     CANNABIS: Smoke marijuana for 4 years and quit in [address] [place] [address] denies. MEDICAL HISTORY:    has a past medical history of Asthma, Atherosclerosis of aorta ([facility] BPH (benign prostatic hypertrophy) (8/23/2012), Cataract, Cholelithiasis, DDD (degenerative disc disease), lumbar, Diverticulitis of sigmoid colon, Diverticulosis of colon, Diverticulosis of sigmoid, Dyslipidemia (2/3/2012), Esophageal ring, Gastritis, GERD (gastroesophageal reflux disease) (2/8/2011), Glaucoma, Hemorrhoids, external (2/8/2011), Herpes simplex of eye, History of colonoscopy (12/13/2017), HTN (hypertension), IGT (impaired glucose tolerance), Iron deficiency anemia (9/7/2011), LVH (left ventricular hypertrophy), Renal cyst, Stricture of colon ([facility] and Subluxation of right lens. ALLERGIES:   Allergies   Allergen Reactions    Hydrochlorothiazide Other (comments)     hyponatremia       VITAL SIGNS:  /73 (BP 1 Location: Left arm, BP Patient Position: Sitting)   Pulse 100   Ht 5' 8\" (1.727 m)   Wt 101.6 kg (224 lb)   BMI 34.06 kg/m²     Current Outpatient Medications   Medication Sig Dispense Refill    sertraline (ZOLOFT) 50 mg tablet TAKE 1 TABLET DAILY FOR ANXIETY WITH DEPRESSION 90 Tab 1    divalproex DR (DEPAKOTE) 250 mg tablet TAKE 1 TABLET TWICE A DAY (BIPOLAR DISORDER IN REMISSION) 180 Tab 1    rosuvastatin (CRESTOR) 20 mg tablet Take 1 Tab by mouth nightly.  90 Tab 3    beclomethasone dipropionate (QVAR REDIHALER) 80 mcg/actuation HFAb inhaler Take 2 Puffs by inhalation two (2) times a day. 3 Inhaler 3    albuterol (PROAIR HFA) 90 mcg/actuation inhaler Take 2 Puffs by inhalation every four (4) hours as needed for Wheezing. 3 Inhaler 3    omeprazole (PRILOSEC) 20 mg capsule TAKE 1 CAPSULE DAILY 90 Cap 3    amLODIPine (NORVASC) 10 mg tablet TAKE 1 TABLET DAILY 90 Tab 3    lisinopril (PRINIVIL, ZESTRIL) 40 mg tablet TAKE ONE-HALF (1/2) TABLET TWICE A DAY 90 Tab 3    prednisoLONE acetate (PRED FORTE) 1 % ophthalmic suspension Administer 1 Drop to left eye daily.  cholecalciferol (VITAMIN D3) 1,000 unit tablet Take 1 Tab by mouth daily. 90 Tab 3    tamsulosin (FLOMAX) 0.4 mg capsule Take 1 Cap by mouth daily. 90 Cap 3    acyclovir (ZOVIRAX) 200 mg capsule Take  by mouth every four (4) hours (while awake).  montelukast (SINGULAIR) 10 mg tablet Take 1 Tab by mouth daily. 90 Tab 3       ROS:  Constitutional: Negative for fever or pain  Eyes: Negative for contacts/glasses  ENT: Negative for hearing loss and tinnitus  Respiratory: Negative for cough or wheezing  Neurological: Negative for memory problems  Behavioral/psych: Positive for insomnia, anxiety, depression, negative for SI/HI  Endocrine: Negative for diabetic symptoms including polyuria and polydipsia        MENTAL STATUS EXAMINATION:   Patient is a 62 y. o. male WHITE OR  who looks slightly older than his stated age. Patient appearance is appropriately dressed with good hygiene. Speech is regular rate and rhythm, fluent language, and thought process is linear, logical, and goal directed. Reported mood is great with mood congruent euthymic affect. Patient denies any suicidal ideation, homicidal ideation, and auditory visual hallucination. Not observed to be delusional or paranoid. Insight, judgement, reliability and impulse control is intact.  Cognition was within normal limit and patient was observed to be reliable. DIAGNOSIS:  Encounter Diagnoses   Name Primary?     Bipolar disorder in remission ([facility] Yes    Anxiety disorder, unspecified type        PLAN:  1. MEDICATION:   1. Bipolar disorder: Depakote 250 mg twice a day, level done November 12 is 62 which is low normal.  He is going to have another level done soon.      2. Depression and anxiety: Zoloft 50 mg daily. He will return in 6 months for reevaluation as he is in the same medication for past 30 some years. He was provided with psycho education, discussed risk/benefits, and expectations from medication changes. Patient agrees with plan.      2. PSYCHOTHERAPY: Patient was provided with supportive therapy, strongly encourage to seek psychotherapy.     3. MEDICAL CARE: Patient was strongly encourage to take their medical medications and follow up with their PCP on regular basis. His current weight is 224 pounds and it is a stable since past 11 months.  His last Depakote level is 58 on November 21, 2018 at same dose. His last lab work was May 13, 2019 which was within normal limit for vitamin D, CBC, CK, and lipid profile except for HDL which is 32 low and his blood sugar was 102 but rest of the CMP was within normal limit. He has hypertension, hemorrhoids, GERD, stricture of colon, impaired glucose tolerance, asthma, herpes simplex of eye, glaucoma, iron deficiency anemia, dyslipidemia, benign prostatic hyperplasia, cataract, plantar fasciitis, and severe diverticulosis left colon.     4. SUBSTANCE ABUSE CARE: Patient quit his smoking, quit drinking, quit his smoking marijuana in 1980.    5. FOLLOW UP:   Follow-up and Dispositions    · Return in about 6 months (around 4/10/2020) for Medication management.

## 2019-10-14 LAB — PSA, EXTERNAL: 0.63

## 2019-11-13 ENCOUNTER — OFFICE VISIT (OUTPATIENT)
Dept: INTERNAL MEDICINE CLINIC | Age: 64
End: 2019-11-13

## 2019-11-13 VITALS
WEIGHT: 225.2 LBS | HEART RATE: 81 BPM | HEIGHT: 68 IN | DIASTOLIC BLOOD PRESSURE: 79 MMHG | TEMPERATURE: 98.8 F | RESPIRATION RATE: 14 BRPM | BODY MASS INDEX: 34.13 KG/M2 | SYSTOLIC BLOOD PRESSURE: 135 MMHG | OXYGEN SATURATION: 97 %

## 2019-11-13 DIAGNOSIS — F31.30 BIPOLAR DISORDER, MOST RECENT EPISODE DEPRESSED (HCC): ICD-10-CM

## 2019-11-13 DIAGNOSIS — R73.02 IGT (IMPAIRED GLUCOSE TOLERANCE): ICD-10-CM

## 2019-11-13 DIAGNOSIS — K21.9 GASTROESOPHAGEAL REFLUX DISEASE WITHOUT ESOPHAGITIS: ICD-10-CM

## 2019-11-13 DIAGNOSIS — Z79.899 MEDICATION MANAGEMENT: ICD-10-CM

## 2019-11-13 DIAGNOSIS — I10 ESSENTIAL HYPERTENSION WITH GOAL BLOOD PRESSURE LESS THAN 140/90: Primary | ICD-10-CM

## 2019-11-13 DIAGNOSIS — E78.5 DYSLIPIDEMIA: ICD-10-CM

## 2019-11-13 LAB — HBA1C MFR BLD HPLC: 5.7 %

## 2019-11-13 RX ORDER — FLUTICASONE PROPIONATE AND SALMETEROL 250; 50 UG/1; UG/1
1 POWDER RESPIRATORY (INHALATION) 2 TIMES DAILY
Qty: 3 INHALER | Refills: 3 | Status: SHIPPED | OUTPATIENT
Start: 2019-11-13 | End: 2020-10-20 | Stop reason: SDUPTHER

## 2019-11-13 NOTE — PROGRESS NOTES
Room 14    Chief Complaint   Patient presents with    Hypertension     f/u    Cholesterol Problem     f/u     1. Have you been to the ER, urgent care clinic since your last visit? Hospitalized since your last visit? No    2. Have you seen or consulted any other health care providers outside of the [address] [place] [address] since your last visit? Include any pap smears or colon screening. Yes When: October Where: [facility] Reason for visit: Yearly PSA testing. There are no preventive care reminders to display for this patient. Tania Blanco

## 2019-11-13 NOTE — PROGRESS NOTES
HPI:  Presents for f/u asthma, HTN, lipids    Using albuterol 2-3 x per day chronically  Taking Qvar as well    Taking and tolerating meds    No CP, SOB, neuro sx    Past medical, Social, and Family history reviewed    Prior to Admission medications    Medication Sig Start Date End Date Taking? Authorizing Provider   sertraline (ZOLOFT) 50 mg tablet TAKE 1 TABLET DAILY FOR ANXIETY WITH DEPRESSION 10/10/19  Yes Alan Diaz MD   divalproex DR (DEPAKOTE) 250 mg tablet TAKE 1 TABLET TWICE A DAY (BIPOLAR DISORDER IN REMISSION) 10/10/19  Yes Alan Diaz MD   rosuvastatin (CRESTOR) 20 mg tablet Take 1 Tab by mouth nightly. 8/26/19  Yes Berenice Kimball MD   beclomethasone dipropionate (QVAR REDIHALER) 80 mcg/actuation HFAb inhaler Take 2 Puffs by inhalation two (2) times a day. 8/12/19  Yes Berenice Kimball MD   albuterol [facility] HFA) 90 mcg/actuation inhaler Take 2 Puffs by inhalation every four (4) hours as needed for Wheezing. 8/6/19  Yes Susie Oseguera MD   omeprazole (PRILOSEC) 20 mg capsule TAKE 1 CAPSULE DAILY 5/28/19  Yes Susie Oseguera MD   amLODIPine (NORVASC) 10 mg tablet TAKE 1 TABLET DAILY 5/17/19  Yes Susie Oseguera MD   lisinopril (PRINIVIL, ZESTRIL) 40 mg tablet TAKE ONE-HALF (1/2) TABLET TWICE A DAY 5/17/19  Yes Susie Oseguera MD   prednisoLONE acetate (PRED FORTE) 1 % ophthalmic suspension Administer 1 Drop to left eye daily. Yes Provider, Historical   cholecalciferol (VITAMIN D3) 1,000 unit tablet Take 1 Tab by mouth daily. 11/13/18  Yes Susie Oseguera MD   tamsulosin (FLOMAX) 0.4 mg capsule Take 1 Cap by mouth daily. 5/10/17  Yes Susie Oseguera MD   acyclovir (ZOVIRAX) 200 mg capsule Take  by mouth every four (4) hours (while awake). Yes Provider, Historical   montelukast (SINGULAIR) 10 mg tablet Take 1 Tab by mouth daily. 5/11/18   Susie Oseguera MD          ROS  Complete ROS reviewed and negative or stable except as noted in HPI.       Physical Exam Constitutional: He is oriented to person, place, and time. He appears well-nourished. No distress. HENT:   Head: Normocephalic and atraumatic. Mouth/Throat: Oropharynx is clear and moist.   Eyes: Pupils are equal, round, and reactive to light. EOM are normal. No scleral icterus. Neck: Normal range of motion. Neck supple. No JVD present. Cardiovascular: Normal rate, regular rhythm and normal heart sounds. Exam reveals no gallop and no friction rub. No murmur heard. Pulmonary/Chest: Effort normal and breath sounds normal. No respiratory distress. He has no wheezes. He has no rales. Prolonged exp phase   Abdominal: Soft. He exhibits no distension. There is no tenderness. Musculoskeletal: Normal range of motion. He exhibits no edema. Lymphadenopathy:     He has no cervical adenopathy. Neurological: He is alert and oriented to person, place, and time. He exhibits normal muscle tone. Skin: Skin is warm. No rash noted. Psychiatric: He has a normal mood and affect. Nursing note and vitals reviewed. Prior labs reviewed. Assessment/Plan:    ICD-10-CM ICD-9-CM    1. Essential hypertension with goal blood pressure less than 140/90 I10 401.9 CBC WITH AUTOMATED DIFF   2. IGT (impaired glucose tolerance) R73.02 790.22 AMB POC HEMOGLOBIN A1C   3. Dyslipidemia E78.5 272.4 CK      LIPID PANEL      METABOLIC PANEL, COMPREHENSIVE   4. Gastroesophageal reflux disease without esophagitis K21.9 530.81    5. Bipolar disorder, most recent episode depressed ([facility] F31.30 296.50 VALPROIC ACID   6. Medication management Z79.899 V58.69 VALPROIC ACID     Follow-up and Dispositions    · Return in about 6 months (around 5/13/2020), or if symptoms worsen or fail to improve, for blood pressure, cholesterol, asthma.         results and schedule of future studies reviewed with patient  reviewed diet, exercise and weight   cardiovascular risk and specific lipid/LDL goals reviewed  reviewed medications and side effects in detail  Change to advair generic  Pt declines PFTs for now - to reconsider.   Consider anticholinergic agent  Continue other current medications

## 2019-11-14 LAB
ALBUMIN SERPL-MCNC: 4 G/DL (ref 3.6–4.8)
ALBUMIN/GLOB SERPL: 1.2 {RATIO} (ref 1.2–2.2)
ALP SERPL-CCNC: 80 IU/L (ref 39–117)
ALT SERPL-CCNC: 10 IU/L (ref 0–44)
AST SERPL-CCNC: 10 IU/L (ref 0–40)
BASOPHILS # BLD AUTO: 0.1 X10E3/UL (ref 0–0.2)
BASOPHILS NFR BLD AUTO: 1 %
BILIRUB SERPL-MCNC: 0.3 MG/DL (ref 0–1.2)
BUN SERPL-MCNC: 15 MG/DL (ref 8–27)
BUN/CREAT SERPL: 18 (ref 10–24)
CALCIUM SERPL-MCNC: 9.6 MG/DL (ref 8.6–10.2)
CHLORIDE SERPL-SCNC: 99 MMOL/L (ref 96–106)
CHOLEST SERPL-MCNC: 127 MG/DL (ref 100–199)
CK SERPL-CCNC: 90 U/L (ref 24–204)
CO2 SERPL-SCNC: 26 MMOL/L (ref 20–29)
CREAT SERPL-MCNC: 0.84 MG/DL (ref 0.76–1.27)
EOSINOPHIL # BLD AUTO: 0.1 X10E3/UL (ref 0–0.4)
EOSINOPHIL NFR BLD AUTO: 1 %
ERYTHROCYTE [DISTWIDTH] IN BLOOD BY AUTOMATED COUNT: 12.8 % (ref 12.3–15.4)
GLOBULIN SER CALC-MCNC: 3.4 G/DL (ref 1.5–4.5)
GLUCOSE SERPL-MCNC: 101 MG/DL (ref 65–99)
HCT VFR BLD AUTO: 42 % (ref 37.5–51)
HDLC SERPL-MCNC: 35 MG/DL
HGB BLD-MCNC: 14.2 G/DL (ref 13–17.7)
IMM GRANULOCYTES # BLD AUTO: 0.1 X10E3/UL (ref 0–0.1)
IMM GRANULOCYTES NFR BLD AUTO: 1 %
LDLC SERPL CALC-MCNC: 69 MG/DL (ref 0–99)
LYMPHOCYTES # BLD AUTO: 2.4 X10E3/UL (ref 0.7–3.1)
LYMPHOCYTES NFR BLD AUTO: 23 %
MCH RBC QN AUTO: 32.1 PG (ref 26.6–33)
MCHC RBC AUTO-ENTMCNC: 33.8 G/DL (ref 31.5–35.7)
MCV RBC AUTO: 95 FL (ref 79–97)
MONOCYTES # BLD AUTO: 0.7 X10E3/UL (ref 0.1–0.9)
MONOCYTES NFR BLD AUTO: 7 %
NEUTROPHILS # BLD AUTO: 7.3 X10E3/UL (ref 1.4–7)
NEUTROPHILS NFR BLD AUTO: 67 %
PLATELET # BLD AUTO: 261 X10E3/UL (ref 150–450)
POTASSIUM SERPL-SCNC: 4.8 MMOL/L (ref 3.5–5.2)
PROT SERPL-MCNC: 7.4 G/DL (ref 6–8.5)
RBC # BLD AUTO: 4.43 X10E6/UL (ref 4.14–5.8)
SODIUM SERPL-SCNC: 141 MMOL/L (ref 134–144)
TRIGL SERPL-MCNC: 116 MG/DL (ref 0–149)
VALPROATE SERPL-MCNC: 62 UG/ML (ref 50–100)
VLDLC SERPL CALC-MCNC: 23 MG/DL (ref 5–40)
WBC # BLD AUTO: 10.5 X10E3/UL (ref 3.4–10.8)

## 2019-11-14 NOTE — PROGRESS NOTES
Labs are all either normal or stable and at goal.  Keep up the good work!   Continue your current medications

## 2020-03-25 ENCOUNTER — VIRTUAL VISIT (OUTPATIENT)
Dept: BEHAVIORAL/MENTAL HEALTH CLINIC | Age: 65
End: 2020-03-25

## 2020-03-25 DIAGNOSIS — F31.30 BIPOLAR DISORDER, MOST RECENT EPISODE DEPRESSED (HCC): Primary | ICD-10-CM

## 2020-03-25 DIAGNOSIS — F41.9 ANXIETY DISORDER, UNSPECIFIED TYPE: ICD-10-CM

## 2020-03-25 RX ORDER — DIVALPROEX SODIUM 250 MG/1
TABLET, DELAYED RELEASE ORAL
Qty: 180 TAB | Refills: 1 | Status: SHIPPED | OUTPATIENT
Start: 2020-03-25 | End: 2020-12-02 | Stop reason: SDUPTHER

## 2020-03-25 RX ORDER — SERTRALINE HYDROCHLORIDE 50 MG/1
TABLET, FILM COATED ORAL
Qty: 90 TAB | Refills: 1 | Status: SHIPPED | OUTPATIENT
Start: 2020-03-25 | End: 2020-12-02 | Stop reason: SDUPTHER

## 2020-03-25 NOTE — PROGRESS NOTES
Rianna Baez is a 62 y.o. male evaluated via telephone on 3/25/2020. 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 Consent:  He and/or health care decision maker is aware that that he may receive a bill for this telephone service, depending on his insurance coverage, and has provided verbal consent to proceed: Yes      Documentation:  I communicated with the patient  Regarding the precautions taken because of COVID-19. We reviewed his medications,allergies, and any new problems. His labs were reviewed indicating a theapeutic level of Depakote 62 with normal LFT and platelets. Details of this discussion including any medical advice provided: We reviewed his past, absence of any hospitalizations or suicide attempts. He is living with his wife of 27 years and has retired. His daughter graduated from [address] [place] [address] bttn , working with a fertility clinic. He has maintained sobriety. MSE: pleasant, cooperative, attentive, with speech that was of normal latency, pitch and tone. His thoughts were linear, logical and goal directed. He denied any SI/HI. No delusions or hallucinations were noted or reported. He was oriented to all spheres and aware of the situation in the [place] regarding COVID-19      I affirm this is a Patient Initiated Episode with an Established Patient who has not had a related appointment within my department in the past 7 days or scheduled within the next 24 hours.     Total Time: minutes: 11-20 minutes    Note: not billable if this call serves to triage the patient into an appointment for the relevant concern      Ezra Burroughs MD

## 2020-05-13 DIAGNOSIS — I10 ESSENTIAL HYPERTENSION WITH GOAL BLOOD PRESSURE LESS THAN 140/90: ICD-10-CM

## 2020-05-13 RX ORDER — AMLODIPINE BESYLATE 10 MG/1
10 TABLET ORAL DAILY
Qty: 90 TAB | Refills: 3 | Status: SHIPPED | OUTPATIENT
Start: 2020-05-13 | End: 2021-04-22

## 2020-05-13 RX ORDER — LISINOPRIL 40 MG/1
20 TABLET ORAL 2 TIMES DAILY
Qty: 90 TAB | Refills: 3 | Status: SHIPPED | OUTPATIENT
Start: 2020-05-13 | End: 2021-04-22

## 2020-05-13 RX ORDER — OMEPRAZOLE 20 MG/1
20 CAPSULE, DELAYED RELEASE ORAL DAILY
Qty: 90 CAP | Refills: 3 | Status: SHIPPED | OUTPATIENT
Start: 2020-05-13 | End: 2021-05-13

## 2020-05-13 NOTE — TELEPHONE ENCOUNTER
Last visit 11/13/2019 MD Cristian Lindsay   Next appointment 06/25/2020 MD Cristian Lindsay   Previous refill encounter(s) 05/17/2019 Norvasc #90 with 3 refills, 05/17/2019 Prinivil #90 with 3 refills, 05/28/2019 Prilosec #90 with 3 refills. Requested Prescriptions     Pending Prescriptions Disp Refills    amLODIPine (NORVASC) 10 mg tablet 90 Tab 0     Sig: Take 1 Tab by mouth daily.  lisinopriL (PRINIVIL, ZESTRIL) 40 mg tablet 90 Tab 3     Sig: Take 0.5 Tabs by mouth two (2) times a day.  omeprazole (PRILOSEC) 20 mg capsule 90 Cap 0     Sig: Take 1 Cap by mouth daily.

## 2020-06-25 ENCOUNTER — VIRTUAL VISIT (OUTPATIENT)
Dept: INTERNAL MEDICINE CLINIC | Age: 65
End: 2020-06-25

## 2020-06-25 DIAGNOSIS — R73.02 IGT (IMPAIRED GLUCOSE TOLERANCE): ICD-10-CM

## 2020-06-25 DIAGNOSIS — I10 ESSENTIAL HYPERTENSION WITH GOAL BLOOD PRESSURE LESS THAN 140/90: Primary | ICD-10-CM

## 2020-06-25 DIAGNOSIS — K56.699 STRICTURE OF COLON (HCC): ICD-10-CM

## 2020-06-25 DIAGNOSIS — E78.5 DYSLIPIDEMIA: ICD-10-CM

## 2020-06-25 DIAGNOSIS — Z79.899 MEDICATION MANAGEMENT: ICD-10-CM

## 2020-06-25 DIAGNOSIS — J45.40 MODERATE PERSISTENT ASTHMA WITHOUT COMPLICATION: ICD-10-CM

## 2020-06-25 DIAGNOSIS — E55.9 VITAMIN D DEFICIENCY: ICD-10-CM

## 2020-06-25 DIAGNOSIS — K21.9 GASTROESOPHAGEAL REFLUX DISEASE WITHOUT ESOPHAGITIS: ICD-10-CM

## 2020-06-25 DIAGNOSIS — I10 ESSENTIAL HYPERTENSION: ICD-10-CM

## 2020-06-25 DIAGNOSIS — F31.30 BIPOLAR DISORDER, MOST RECENT EPISODE DEPRESSED (HCC): ICD-10-CM

## 2020-06-25 NOTE — PROGRESS NOTES
Divine Cunningham is a 62 y.o. male who was seen by synchronous (real-time) audio-video technology on 6/25/2020. Consent: Divine Cunningham, who was seen by synchronous (real-time) audio-video technology, and/or his healthcare decision maker, is aware that this patient-initiated, Telehealth encounter on 6/25/2020 is a billable service, with coverage as determined by his insurance carrier. He is aware that he may receive a bill and has provided verbal consent to proceed: Yes. I was in the office while conducting this encounter. Subjective:   Divine Cunningham was seen for Blood Pressure Check; Cholesterol Problem; and Asthma      Notes:  Much less use of albuterol since on the [facility] inhaler use down at least 75%. Sleeping well. Mood and psych stable on current regimen. No CP, SOB, neuro sx      Nursing screenings reviewed by provider at visit. Past medical, Social, and Family history reviewed  Medications reviewed and updated. Allergies   Allergen Reactions    Hydrochlorothiazide Other (comments)     hyponatremia       Prior to Admission medications    Medication Sig Start Date End Date Taking? Authorizing Provider   amLODIPine (NORVASC) 10 mg tablet Take 1 Tab by mouth daily. 5/13/20  Yes Mary Bishop MD   lisinopriL (PRINIVIL, ZESTRIL) 40 mg tablet Take 0.5 Tabs by mouth two (2) times a day. 5/13/20  Yes Mary Bishop MD   omeprazole (PRILOSEC) 20 mg capsule Take 1 Cap by mouth daily. 5/13/20  Yes Mary Bishop MD   sertraline (ZOLOFT) 50 mg tablet TAKE 1 TABLET DAILY FOR ANXIETY WITH DEPRESSION 3/25/20  Yes Sultana RETA Gonzalez MD   divalproex DR (DEPAKOTE) 250 mg tablet TAKE 1 TABLET TWICE A DAY (BIPOLAR DISORDER IN REMISSION) 3/25/20  Yes Sultana RETA Gonzalez MD   fluticasone propion-salmeterol (ADVAIR/WIXELA) 250-50 mcg/dose diskus inhaler Take 1 Puff by inhalation two (2) times a day.  11/13/19  Yes Mary Bishop MD   rosuvastatin (CRESTOR) 20 mg tablet Take 1 Tab by mouth nightly. 8/26/19  Yes Jayshree Coyne MD   albuterol [facility] HFA) 90 mcg/actuation inhaler Take 2 Puffs by inhalation every four (4) hours as needed for Wheezing. 8/6/19  Yes Ana Paula Smart MD   prednisoLONE acetate (PRED FORTE) 1 % ophthalmic suspension Administer 1 Drop to left eye daily. Yes Provider, Historical   cholecalciferol (VITAMIN D3) 1,000 unit tablet Take 1 Tab by mouth daily. 11/13/18  Yes Ana Paula Smart MD   tamsulosin (FLOMAX) 0.4 mg capsule Take 1 Cap by mouth daily. 5/10/17  Yes Ana Paula Smart MD   acyclovir (ZOVIRAX) 200 mg capsule Take  by mouth every four (4) hours (while awake). Yes Provider, Historical   montelukast (SINGULAIR) 10 mg tablet Take 1 Tab by mouth daily. 5/11/18   Ana Paula Smart MD          ROS     A complete ROS was performed and negative except as noted in HPI      PHYSICAL EXAMINATION:    Vital Signs: There were no vitals taken for this visit. Constitutional: [x] Appears well-developed and well-nourished [x] No apparent distress      Mental status: [x] Alert and awake  [x] Oriented [x] Able to follow commands       Eyes:   EOM    [x]  Normal      Sclera  [x]  Normal              Discharge [x]  None visible       HENT: [x] Normocephalic, atraumatic    [x] Mouth/Throat: Mucous membranes are moist    External Ears [x] Normal      Neck: [x] No visualized mass     Pulmonary/Chest: [x] Respiratory effort normal   [x] No visualized signs of difficulty breathing or respiratory distress    Musculoskeletal:  [x] Normal range of motion of neck    Neurological:        [x] No Facial Asymmetry (Cranial nerve 7 motor function) (limited exam due to video visit)          [x] No gaze palsy     Skin:        [x] No significant exanthematous lesions or discoloration noted on facial skin             Psychiatric:       [x] Normal Affect       Other pertinent observable physical exam findings:  None. Prior labs reviewed.       We discussed the expected course, resolution and complications of the diagnosis(es) in detail. Medication risks, benefits, costs, interactions, and alternatives were discussed as indicated. I advised him to contact the office if his condition worsens, changes or fails to improve as anticipated. He expressed understanding with the diagnosis(es) and plan. Jarome Kocher is a 62 y.o. male who was evaluated by a video visit encounter for concerns as above. Patient identification was verified prior to start of the visit. A caregiver was present when appropriate. Due to this being a TeleHealth encounter (During JNWPT-26 public health emergency), evaluation of the following organ systems was limited: Vitals/Constitutional/EENT/Resp/CV/GI//MS/Neuro/Skin/Heme-Lymph-Imm. Pursuant to the emergency declaration under the [address] [place] [address] waiver authority and the [employer] and [employer] Act, this Virtual  Visit was conducted, with patient's (and/or legal guardian's) consent, to reduce the patient's risk of exposure to COVID-19 and provide necessary medical care. Services were provided through a video synchronous discussion virtually to substitute for in-person clinic visit. Assessment & Plan:   Diagnoses and all orders for this visit:       ICD-10-CM ICD-9-CM    1. Essential hypertension with goal blood pressure less than 140/90 I10 401.9    2. IGT (impaired glucose tolerance) R73.02 790.22 HEMOGLOBIN A1C WITH EAG   3. Dyslipidemia E78.5 272.4 CK      LIPID PANEL      METABOLIC PANEL, COMPREHENSIVE   4. Gastroesophageal reflux disease without esophagitis K21.9 530.81    5. Bipolar disorder, most recent episode depressed ([facility] F31.30 296.50 VALPROIC ACID   6. Medication management Z79.899 V58.69 VALPROIC ACID   7. Essential hypertension I10 401.9 CBC WITH AUTOMATED DIFF   8. Moderate persistent asthma without complication M67.07 005.94    9.  Vitamin D deficiency E55.9 268.9 VITAMIN D, 25 HYDROXY   10. Stricture of colon ([facility] K56.699 560.9      Follow-up and Dispositions    · Return in about 6 months (around 12/25/2020), or if symptoms worsen or fail to improve, for asthma, blood pressure - IO.        results and schedule of future studies reviewed with patient  reviewed diet, exercise and weight   cardiovascular risk and specific lipid/LDL goals reviewed  reviewed medications and side effects in detail    consider cologuard for colon cancer screening in 2022  Continue current medications   Labs at lab     AVS:  [x]  Sent to patient as MYTRNDhart message after visit. []  Mailed to patient after visit. []  Not sent to patient after visit.       Future Appointments   Date Time Provider Gini Simon   6/25/2020  9:45 AM MD DEVON Murphy   9/25/2020  1:00 PM Bridget Mcguire MD [facility]

## 2020-07-07 LAB
25(OH)D3+25(OH)D2 SERPL-MCNC: 28.5 NG/ML (ref 30–100)
ALBUMIN SERPL-MCNC: 4.4 G/DL (ref 3.8–4.8)
ALBUMIN/GLOB SERPL: 1.5 {RATIO} (ref 1.2–2.2)
ALP SERPL-CCNC: 93 IU/L (ref 39–117)
ALT SERPL-CCNC: 11 IU/L (ref 0–44)
AST SERPL-CCNC: 10 IU/L (ref 0–40)
BASOPHILS # BLD AUTO: 0.1 X10E3/UL (ref 0–0.2)
BASOPHILS NFR BLD AUTO: 1 %
BILIRUB SERPL-MCNC: 0.4 MG/DL (ref 0–1.2)
BUN SERPL-MCNC: 15 MG/DL (ref 8–27)
BUN/CREAT SERPL: 15 (ref 10–24)
CALCIUM SERPL-MCNC: 9.6 MG/DL (ref 8.6–10.2)
CHLORIDE SERPL-SCNC: 99 MMOL/L (ref 96–106)
CHOLEST SERPL-MCNC: 126 MG/DL (ref 100–199)
CK SERPL-CCNC: 152 U/L (ref 41–331)
CO2 SERPL-SCNC: 26 MMOL/L (ref 20–29)
CREAT SERPL-MCNC: 0.97 MG/DL (ref 0.76–1.27)
EOSINOPHIL # BLD AUTO: 0.1 X10E3/UL (ref 0–0.4)
EOSINOPHIL NFR BLD AUTO: 1 %
ERYTHROCYTE [DISTWIDTH] IN BLOOD BY AUTOMATED COUNT: 12.1 % (ref 11.6–15.4)
EST. AVERAGE GLUCOSE BLD GHB EST-MCNC: 128 MG/DL
GLOBULIN SER CALC-MCNC: 2.9 G/DL (ref 1.5–4.5)
GLUCOSE SERPL-MCNC: 99 MG/DL (ref 65–99)
HBA1C MFR BLD: 6.1 % (ref 4.8–5.6)
HCT VFR BLD AUTO: 42.3 % (ref 37.5–51)
HDLC SERPL-MCNC: 30 MG/DL
HGB BLD-MCNC: 14.2 G/DL (ref 13–17.7)
IMM GRANULOCYTES # BLD AUTO: 0 X10E3/UL (ref 0–0.1)
IMM GRANULOCYTES NFR BLD AUTO: 0 %
LDLC SERPL CALC-MCNC: 60 MG/DL (ref 0–99)
LYMPHOCYTES # BLD AUTO: 2.7 X10E3/UL (ref 0.7–3.1)
LYMPHOCYTES NFR BLD AUTO: 20 %
MCH RBC QN AUTO: 31.4 PG (ref 26.6–33)
MCHC RBC AUTO-ENTMCNC: 33.6 G/DL (ref 31.5–35.7)
MCV RBC AUTO: 94 FL (ref 79–97)
MONOCYTES # BLD AUTO: 1 X10E3/UL (ref 0.1–0.9)
MONOCYTES NFR BLD AUTO: 8 %
NEUTROPHILS # BLD AUTO: 9.7 X10E3/UL (ref 1.4–7)
NEUTROPHILS NFR BLD AUTO: 70 %
PLATELET # BLD AUTO: 275 X10E3/UL (ref 150–450)
POTASSIUM SERPL-SCNC: 4.9 MMOL/L (ref 3.5–5.2)
PROT SERPL-MCNC: 7.3 G/DL (ref 6–8.5)
RBC # BLD AUTO: 4.52 X10E6/UL (ref 4.14–5.8)
SODIUM SERPL-SCNC: 138 MMOL/L (ref 134–144)
TRIGL SERPL-MCNC: 181 MG/DL (ref 0–149)
VALPROATE SERPL-MCNC: 38 UG/ML (ref 50–100)
VLDLC SERPL CALC-MCNC: 36 MG/DL (ref 5–40)
WBC # BLD AUTO: 13.6 X10E3/UL (ref 3.4–10.8)

## 2020-07-08 NOTE — PROGRESS NOTES
White blood cell count is elevated. Let me known if you have any fevers or acute illness symptoms that could represent infection. Vitamin D is a little low - take an additional 1000 units per day of vitamin D. Valproic acid level is low - review this with the psychiatrist.  Other labs are either normal or stable and at goal.  Continue your current medications .

## 2020-08-06 DIAGNOSIS — E78.5 DYSLIPIDEMIA: ICD-10-CM

## 2020-08-07 RX ORDER — ROSUVASTATIN CALCIUM 20 MG/1
TABLET, COATED ORAL
Qty: 90 TAB | Refills: 3 | Status: SHIPPED | OUTPATIENT
Start: 2020-08-07 | End: 2021-08-23 | Stop reason: SDUPTHER

## 2020-08-18 RX ORDER — ALBUTEROL SULFATE 90 UG/1
2 AEROSOL, METERED RESPIRATORY (INHALATION)
Qty: 3 INHALER | Refills: 3 | Status: SHIPPED | OUTPATIENT
Start: 2020-08-18 | End: 2021-09-24 | Stop reason: SDUPTHER

## 2020-10-21 RX ORDER — FLUTICASONE PROPIONATE AND SALMETEROL 250; 50 UG/1; UG/1
1 POWDER RESPIRATORY (INHALATION) 2 TIMES DAILY
Qty: 3 INHALER | Refills: 3 | Status: SHIPPED | OUTPATIENT
Start: 2020-10-21 | End: 2021-10-20 | Stop reason: SDUPTHER

## 2020-10-21 NOTE — TELEPHONE ENCOUNTER
Last visit 06/25/2020 Virtual visit MD Stephanie Victor   Next appointment 12/28/2020 MD Stephanie Victor   Previous refill encounter(s)   11/13/2019 Advair # with 3 refills. Requested Prescriptions     Pending Prescriptions Disp Refills    fluticasone propion-salmeteroL (ADVAIR/WIXELA) 250-50 mcg/dose diskus inhaler 3 Inhaler 3     Sig: Take 1 Puff by inhalation two (2) times a day.

## 2020-11-30 ENCOUNTER — PATIENT MESSAGE (OUTPATIENT)
Dept: INTERNAL MEDICINE CLINIC | Age: 65
End: 2020-11-30

## 2020-11-30 DIAGNOSIS — F41.9 ANXIETY DISORDER, UNSPECIFIED TYPE: ICD-10-CM

## 2020-11-30 DIAGNOSIS — F31.30 BIPOLAR DISORDER, MOST RECENT EPISODE DEPRESSED (HCC): ICD-10-CM

## 2020-12-02 RX ORDER — SERTRALINE HYDROCHLORIDE 50 MG/1
TABLET, FILM COATED ORAL
Qty: 90 TAB | Refills: 1 | Status: SHIPPED | OUTPATIENT
Start: 2020-12-02 | End: 2021-05-31 | Stop reason: SDUPTHER

## 2020-12-02 RX ORDER — DIVALPROEX SODIUM 250 MG/1
TABLET, DELAYED RELEASE ORAL
Qty: 180 TAB | Refills: 1 | Status: SHIPPED | OUTPATIENT
Start: 2020-12-02 | End: 2021-05-31 | Stop reason: SDUPTHER

## 2020-12-02 NOTE — TELEPHONE ENCOUNTER
From: Melissa Mantilla  To: Alessandra Barnett MD  Sent: 11/30/2020 6:22 PM EST  Subject: Prescription Question    I requested a refill on my chart for sertraline 50 mg. tabs and divalproex sodium dr 250 mg tabs. It occurred to me that this would not go through since it would go to [facility]. At our tele-visit you said you could refill these for now.  Thank you Melissa Mantilla

## 2020-12-28 ENCOUNTER — OFFICE VISIT (OUTPATIENT)
Dept: INTERNAL MEDICINE CLINIC | Age: 65
End: 2020-12-28
Payer: COMMERCIAL

## 2020-12-28 VITALS
OXYGEN SATURATION: 94 % | SYSTOLIC BLOOD PRESSURE: 117 MMHG | WEIGHT: 210 LBS | TEMPERATURE: 98.7 F | HEART RATE: 87 BPM | BODY MASS INDEX: 31.83 KG/M2 | HEIGHT: 68 IN | DIASTOLIC BLOOD PRESSURE: 71 MMHG

## 2020-12-28 DIAGNOSIS — Z79.899 MEDICATION MANAGEMENT: ICD-10-CM

## 2020-12-28 DIAGNOSIS — I10 ESSENTIAL HYPERTENSION: ICD-10-CM

## 2020-12-28 DIAGNOSIS — J45.909 UNCOMPLICATED ASTHMA, UNSPECIFIED ASTHMA SEVERITY, UNSPECIFIED WHETHER PERSISTENT: Primary | ICD-10-CM

## 2020-12-28 DIAGNOSIS — E55.9 VITAMIN D DEFICIENCY: ICD-10-CM

## 2020-12-28 DIAGNOSIS — R73.02 IGT (IMPAIRED GLUCOSE TOLERANCE): ICD-10-CM

## 2020-12-28 DIAGNOSIS — E78.5 DYSLIPIDEMIA: ICD-10-CM

## 2020-12-28 DIAGNOSIS — F31.30 BIPOLAR DISORDER, MOST RECENT EPISODE DEPRESSED (HCC): ICD-10-CM

## 2020-12-28 LAB
25(OH)D3 SERPL-MCNC: 32.1 NG/ML (ref 30–100)
ALBUMIN SERPL-MCNC: 3.6 G/DL (ref 3.5–5)
ALBUMIN/GLOB SERPL: 1 {RATIO} (ref 1.1–2.2)
ALP SERPL-CCNC: 100 U/L (ref 45–117)
ALT SERPL-CCNC: 14 U/L (ref 12–78)
ANION GAP SERPL CALC-SCNC: 2 MMOL/L (ref 5–15)
AST SERPL-CCNC: 8 U/L (ref 15–37)
BASOPHILS # BLD: 0.1 K/UL (ref 0–0.1)
BASOPHILS NFR BLD: 1 % (ref 0–1)
BILIRUB SERPL-MCNC: 0.3 MG/DL (ref 0.2–1)
BUN SERPL-MCNC: 20 MG/DL (ref 6–20)
BUN/CREAT SERPL: 19 (ref 12–20)
CALCIUM SERPL-MCNC: 8.9 MG/DL (ref 8.5–10.1)
CHLORIDE SERPL-SCNC: 105 MMOL/L (ref 97–108)
CHOLEST SERPL-MCNC: 130 MG/DL
CK SERPL-CCNC: 63 U/L (ref 39–308)
CO2 SERPL-SCNC: 29 MMOL/L (ref 21–32)
COMMENT, HOLDF: NORMAL
CREAT SERPL-MCNC: 1.05 MG/DL (ref 0.7–1.3)
DIFFERENTIAL METHOD BLD: ABNORMAL
EOSINOPHIL # BLD: 0.2 K/UL (ref 0–0.4)
EOSINOPHIL NFR BLD: 2 % (ref 0–7)
ERYTHROCYTE [DISTWIDTH] IN BLOOD BY AUTOMATED COUNT: 12 % (ref 11.5–14.5)
EST. AVERAGE GLUCOSE BLD GHB EST-MCNC: 117 MG/DL
GLOBULIN SER CALC-MCNC: 3.7 G/DL (ref 2–4)
GLUCOSE SERPL-MCNC: 112 MG/DL (ref 65–100)
HBA1C MFR BLD: 5.7 % (ref 4–5.6)
HCT VFR BLD AUTO: 42.4 % (ref 36.6–50.3)
HDLC SERPL-MCNC: 35 MG/DL
HDLC SERPL: 3.7 {RATIO} (ref 0–5)
HGB BLD-MCNC: 13.7 G/DL (ref 12.1–17)
IMM GRANULOCYTES # BLD AUTO: 0 K/UL (ref 0–0.04)
IMM GRANULOCYTES NFR BLD AUTO: 0 % (ref 0–0.5)
LDLC SERPL CALC-MCNC: 72.2 MG/DL (ref 0–100)
LIPID PROFILE,FLP: NORMAL
LYMPHOCYTES # BLD: 1.9 K/UL (ref 0.8–3.5)
LYMPHOCYTES NFR BLD: 18 % (ref 12–49)
MCH RBC QN AUTO: 32.3 PG (ref 26–34)
MCHC RBC AUTO-ENTMCNC: 32.3 G/DL (ref 30–36.5)
MCV RBC AUTO: 100 FL (ref 80–99)
MONOCYTES # BLD: 0.9 K/UL (ref 0–1)
MONOCYTES NFR BLD: 8 % (ref 5–13)
NEUTS SEG # BLD: 7.7 K/UL (ref 1.8–8)
NEUTS SEG NFR BLD: 71 % (ref 32–75)
NRBC # BLD: 0 K/UL (ref 0–0.01)
NRBC BLD-RTO: 0 PER 100 WBC
PLATELET # BLD AUTO: 258 K/UL (ref 150–400)
PMV BLD AUTO: 11.4 FL (ref 8.9–12.9)
POTASSIUM SERPL-SCNC: 4.5 MMOL/L (ref 3.5–5.1)
PROT SERPL-MCNC: 7.3 G/DL (ref 6.4–8.2)
RBC # BLD AUTO: 4.24 M/UL (ref 4.1–5.7)
SAMPLES BEING HELD,HOLD: NORMAL
SODIUM SERPL-SCNC: 136 MMOL/L (ref 136–145)
TRIGL SERPL-MCNC: 114 MG/DL (ref ?–150)
VALPROATE SERPL-MCNC: 52 UG/ML (ref 50–100)
VLDLC SERPL CALC-MCNC: 22.8 MG/DL
WBC # BLD AUTO: 10.8 K/UL (ref 4.1–11.1)

## 2020-12-28 PROCEDURE — 99214 OFFICE O/P EST MOD 30 MIN: CPT | Performed by: INTERNAL MEDICINE

## 2020-12-28 NOTE — PROGRESS NOTES
RM 13    Chief Complaint   Patient presents with    Routine     Follow-up with asthma, blood pressure     Patient presents to visit for routine bp and asthma check. 1. Have you been to the ER, urgent care clinic since your last visit? Hospitalized since your last visit? No    2. Have you seen or consulted any other health care providers outside of the [address] [place] [address] Mahesh since your last visit? Include any pap smears or colon screening. Urologist in 10/2020 yearly screening. [employer] for annual screening. There are no preventive care reminders to display for this patient. Recent Travel Screening and Travel History documentation     Travel Screening     Question   Response    In the last month, have you been in contact with someone who was confirmed or suspected to have Coronavirus / COVID-19? No / Unsure    Have you had a COVID-19 viral test in the last 14 days? No    Do you have any of the following new or worsening symptoms? None of these    Have you traveled internationally in the last month? No      Travel History   Travel since 11/28/20     No documented travel since 11/28/20              Learning Assessment 4/1/2019   PRIMARY LEARNER Patient   HIGHEST LEVEL OF EDUCATION - PRIMARY LEARNER  -   BARRIERS PRIMARY LEARNER -   CO-LEARNER CAREGIVER -   PRIMARY LANGUAGE ENGLISH   LEARNER PREFERENCE PRIMARY OTHER (COMMENT)   ANSWERED BY patient    RELATIONSHIP SELF         AVS  education, follow up, and recommendations provided and addressed with patient.   services used to advise patient n

## 2020-12-28 NOTE — PROGRESS NOTES
HPI:  Presents for f/u asthma, lipids, HTN, etc    Pt is happy with generic wixela LABA/ICS product  resp status stable and doing well. BP controlled    Mood stable on current regimen    Pt has been more active within the past year which allowed for weight loss    Urology checked PSA      Past medical, Social, and Family history reviewed    Prior to Admission medications    Medication Sig Start Date End Date Taking? Authorizing Provider   divalproex DR (DEPAKOTE) 250 mg tablet TAKE 1 TABLET TWICE A DAY (BIPOLAR DISORDER IN REMISSION) 12/2/20  Yes Ryann Mcgrath MD   sertraline (ZOLOFT) 50 mg tablet TAKE 1 TABLET DAILY FOR ANXIETY WITH DEPRESSION 12/2/20  Yes Ryann Mcgrath MD   fluticasone propion-salmeteroL (ADVAIR/WIXELA) 250-50 mcg/dose diskus inhaler Take 1 Puff by inhalation two (2) times a day. 10/21/20  Yes Ryann Mcgrath MD   albuterol (ProAir HFA) 90 mcg/actuation inhaler Take 2 Puffs by inhalation every four (4) hours as needed for Wheezing. 8/18/20  Yes Ryann Mcgrath MD   rosuvastatin (CRESTOR) 20 mg tablet TAKE 1 TABLET NIGHTLY 8/7/20  Yes Ryann Mcgrtah MD   amLODIPine (NORVASC) 10 mg tablet Take 1 Tab by mouth daily. 5/13/20  Yes Ryann Mcgrath MD   lisinopriL (PRINIVIL, ZESTRIL) 40 mg tablet Take 0.5 Tabs by mouth two (2) times a day. 5/13/20  Yes Ryann Mcgrath MD   omeprazole (PRILOSEC) 20 mg capsule Take 1 Cap by mouth daily. 5/13/20  Yes Ryann Mcgrath MD   prednisoLONE acetate (PRED FORTE) 1 % ophthalmic suspension Administer 1 Drop to left eye daily. Yes Provider, Historical   cholecalciferol (VITAMIN D3) 1,000 unit tablet Take 1 Tab by mouth daily. 11/13/18  Yes Ryann Mcgrath MD   tamsulosin (FLOMAX) 0.4 mg capsule Take 1 Cap by mouth daily. 5/10/17  Yes Ryann Mcgrath MD   acyclovir (ZOVIRAX) 200 mg capsule Take  by mouth every four (4) hours (while awake).    Yes Provider, Historical   montelukast (SINGULAIR) 10 mg tablet Take 1 Tab by mouth daily. 5/11/18   Burgess Yunior MD          ROS  Complete ROS reviewed and negative or stable except as noted in HPI. Physical Exam  Vitals signs and nursing note reviewed. Constitutional:       General: He is not in acute distress. HENT:      Head: Normocephalic and atraumatic. Eyes:      General: No scleral icterus. Pupils: Pupils are equal, round, and reactive to light. Neck:      Musculoskeletal: Normal range of motion and neck supple. Vascular: No JVD. Cardiovascular:      Rate and Rhythm: Normal rate and regular rhythm. Heart sounds: Normal heart sounds. No murmur. No friction rub. No gallop. Pulmonary:      Effort: Pulmonary effort is normal. No respiratory distress. Breath sounds: Normal breath sounds. No wheezing or rales. Abdominal:      General: There is no distension. Palpations: Abdomen is soft. Tenderness: There is no abdominal tenderness. Musculoskeletal: Normal range of motion. Lymphadenopathy:      Cervical: No cervical adenopathy. Skin:     General: Skin is warm. Findings: No rash. Neurological:      Mental Status: He is alert and oriented to person, place, and time. Motor: No abnormal muscle tone. Prior labs reviewed. Assessment/Plan:    ICD-10-CM ICD-9-CM    1. Uncomplicated asthma, unspecified asthma severity, unspecified whether persistent  J45.909 493.90    2. Essential hypertension  I10 401.9 CBC WITH AUTOMATED DIFF      CBC WITH AUTOMATED DIFF   3. IGT (impaired glucose tolerance)  R73.02 790.22 HEMOGLOBIN A1C WITH EAG      HEMOGLOBIN A1C WITH EAG   4. Dyslipidemia  E78.5 719.4 CK      METABOLIC PANEL, COMPREHENSIVE      LIPID PANEL      LIPID PANEL      METABOLIC PANEL, COMPREHENSIVE      CK   5. Bipolar disorder, most recent episode depressed ([facility]  F31.30 296.50    6. Medication management  Z79.899 V58.69 VALPROIC ACID      VALPROIC ACID   7.  Vitamin D deficiency  E55.9 268.9 VITAMIN D, 25 HYDROXY VITAMIN D, 25 HYDROXY     Follow-up and Dispositions    · Return in about 6 months (around 6/28/2021), or if symptoms worsen or fail to improve, for blood pressure, cholesterol, asthma - may be virtual.       results and schedule of future studies reviewed with patient  reviewed diet, exercise and weight   cardiovascular risk and specific lipid/LDL goals reviewed  reviewed medications and side effects in detail     Tdap in a couple of months  Continue current medications   Fasting labs

## 2020-12-29 NOTE — PROGRESS NOTES
Labs are either normal or stable and at goal.  Keep up the good work!   Continue your current medications

## 2021-04-22 DIAGNOSIS — I10 ESSENTIAL HYPERTENSION WITH GOAL BLOOD PRESSURE LESS THAN 140/90: ICD-10-CM

## 2021-04-22 RX ORDER — AMLODIPINE BESYLATE 10 MG/1
TABLET ORAL
Qty: 90 TAB | Refills: 3 | Status: SHIPPED | OUTPATIENT
Start: 2021-04-22 | End: 2022-04-25 | Stop reason: SDUPTHER

## 2021-04-22 RX ORDER — LISINOPRIL 40 MG/1
TABLET ORAL
Qty: 90 TAB | Refills: 3 | Status: SHIPPED | OUTPATIENT
Start: 2021-04-22 | End: 2022-04-25 | Stop reason: SDUPTHER

## 2021-05-13 RX ORDER — OMEPRAZOLE 20 MG/1
CAPSULE, DELAYED RELEASE ORAL
Qty: 90 CAP | Refills: 3 | Status: SHIPPED | OUTPATIENT
Start: 2021-05-13 | End: 2022-05-09 | Stop reason: SDUPTHER

## 2021-05-31 DIAGNOSIS — F41.9 ANXIETY DISORDER, UNSPECIFIED TYPE: ICD-10-CM

## 2021-05-31 DIAGNOSIS — F31.30 BIPOLAR DISORDER, MOST RECENT EPISODE DEPRESSED (HCC): ICD-10-CM

## 2021-06-01 RX ORDER — DIVALPROEX SODIUM 250 MG/1
TABLET, DELAYED RELEASE ORAL
Qty: 180 TABLET | Refills: 1 | Status: SHIPPED | OUTPATIENT
Start: 2021-06-01 | End: 2021-06-06

## 2021-06-01 RX ORDER — SERTRALINE HYDROCHLORIDE 50 MG/1
TABLET, FILM COATED ORAL
Qty: 90 TABLET | Refills: 1 | Status: SHIPPED | OUTPATIENT
Start: 2021-06-01 | End: 2021-06-06

## 2021-06-05 DIAGNOSIS — F31.30 BIPOLAR DISORDER, MOST RECENT EPISODE DEPRESSED (HCC): ICD-10-CM

## 2021-06-05 DIAGNOSIS — F41.9 ANXIETY DISORDER, UNSPECIFIED TYPE: ICD-10-CM

## 2021-06-06 RX ORDER — DIVALPROEX SODIUM 250 MG/1
TABLET, DELAYED RELEASE ORAL
Qty: 180 TABLET | Refills: 1 | Status: SHIPPED | OUTPATIENT
Start: 2021-06-06 | End: 2021-11-29 | Stop reason: SDUPTHER

## 2021-06-06 RX ORDER — SERTRALINE HYDROCHLORIDE 50 MG/1
TABLET, FILM COATED ORAL
Qty: 90 TABLET | Refills: 1 | Status: SHIPPED | OUTPATIENT
Start: 2021-06-06 | End: 2021-11-29 | Stop reason: SDUPTHER

## 2021-06-28 ENCOUNTER — DOCUMENTATION ONLY (OUTPATIENT)
Dept: INTERNAL MEDICINE CLINIC | Age: 66
End: 2021-06-28

## 2021-06-28 ENCOUNTER — VIRTUAL VISIT (OUTPATIENT)
Dept: INTERNAL MEDICINE CLINIC | Age: 66
End: 2021-06-28
Payer: COMMERCIAL

## 2021-06-28 DIAGNOSIS — E55.9 VITAMIN D DEFICIENCY: ICD-10-CM

## 2021-06-28 DIAGNOSIS — F31.30 BIPOLAR DISORDER, MOST RECENT EPISODE DEPRESSED (HCC): ICD-10-CM

## 2021-06-28 DIAGNOSIS — K21.9 GASTROESOPHAGEAL REFLUX DISEASE WITHOUT ESOPHAGITIS: ICD-10-CM

## 2021-06-28 DIAGNOSIS — I10 ESSENTIAL HYPERTENSION WITH GOAL BLOOD PRESSURE LESS THAN 140/90: Primary | ICD-10-CM

## 2021-06-28 DIAGNOSIS — R73.02 IGT (IMPAIRED GLUCOSE TOLERANCE): ICD-10-CM

## 2021-06-28 DIAGNOSIS — E78.5 DYSLIPIDEMIA: ICD-10-CM

## 2021-06-28 DIAGNOSIS — J45.909 UNCOMPLICATED ASTHMA, UNSPECIFIED ASTHMA SEVERITY, UNSPECIFIED WHETHER PERSISTENT: ICD-10-CM

## 2021-06-28 PROCEDURE — 99214 OFFICE O/P EST MOD 30 MIN: CPT | Performed by: INTERNAL MEDICINE

## 2021-06-28 NOTE — PROGRESS NOTES
VIRTUAL VISIT  Link sent to requested e-mail address for DOMINGO [email or web address]    Chief Complaint   Patient presents with    Asthma     6 month f/u    Cholesterol Problem    Blood Pressure Check        Recent Travel Screening and Travel History documentation     Travel Screening     Question   Response    In the last month, have you been in contact with someone who was confirmed or suspected to have Coronavirus / COVID-19? No / Unsure    Have you had a COVID-19 viral test in the last 14 days? No    Do you have any of the following new or worsening symptoms? None of these    Have you traveled internationally or domestically in the last month? No      Travel History   Travel since 05/28/21     No documented travel since 05/28/21        Abuse Screening Questionnaire 6/25/2020   Do you ever feel afraid of your partner? N   Are you in a relationship with someone who physically or mentally threatens you? N   Is it safe for you to go home? Y            1. Have you been to the ER, urgent care clinic since your last visit? Hospitalized since your last visit? No    2. Have you seen or consulted any other health care providers outside of the [address] [place] [address] since your last visit? Include any pap smears or colon screening. No     There are no preventive care reminders to display for this patient.      Learning Assessment 4/1/2019   PRIMARY LEARNER Patient   HIGHEST LEVEL OF EDUCATION - PRIMARY LEARNER  -   BARRIERS PRIMARY LEARNER -   CO-LEARNER CAREGIVER -   PRIMARY LANGUAGE ENGLISH   LEARNER PREFERENCE PRIMARY OTHER (COMMENT)   ANSWERED BY patient    RELATIONSHIP SELF

## 2021-06-28 NOTE — PROGRESS NOTES
Keyonna Santillan (: 1955) is a 60 y.o. male, established patient, here for evaluation of the following chief complaint(s)--see below:    Keyonna Santillan is a 60 y.o. male who was seen by synchronous (real-time) audio-video technology on 2021. Consent: Keyonna Santillan, who was seen by synchronous (real-time) audio-video technology, and/or his healthcare decision maker, is aware that this patient-initiated, Telehealth encounter on 2021 is a billable service, with coverage as determined by his insurance carrier. He is aware that he may receive a bill and has provided verbal consent to proceed: Yes. I was in the office while conducting this encounter. Assessment & Plan:   Diagnoses and all orders for this visit:      ICD-10-CM ICD-9-CM    1. Essential hypertension with goal blood pressure less than 140/90  I10 401.9 CBC WITH AUTOMATED DIFF   2. Bipolar disorder, most recent episode depressed ([facility]  F31.30 296.50 VALPROIC ACID   3. Uncomplicated asthma, unspecified asthma severity, unspecified whether persistent  J45.909 493.90    4. IGT (impaired glucose tolerance)  R73.02 790.22 HEMOGLOBIN A1C WITH EAG   5. Dyslipidemia  E78.5 629.0 CK      METABOLIC PANEL, COMPREHENSIVE      LIPID PANEL   6. Vitamin D deficiency  E55.9 268.9 VITAMIN D, 25 HYDROXY   7. Gastroesophageal reflux disease without esophagitis  K21.9 530.81 CBC WITH AUTOMATED DIFF     Follow-up and Dispositions    · Return in about 6 months (around 2021), or if symptoms worsen or fail to improve, for asthma, blood pressure. results and schedule of future studies reviewed with patient  reviewed diet, exercise and weight   cardiovascular risk and specific lipid/LDL goals reviewed  reviewed medications and side effects in detail    Continue current medications   Labs       AVS:  [x]  Available to patient in [facility] after visit signed. []  Mailed to patient after visit. []  Not sent to patient after visit.          Subjective: Ruth Zaidi was seen for:  Chief Complaint   Patient presents with    Asthma     6 month f/u    Cholesterol Problem    Blood Pressure Check       Notes:   Presents for f/u asthma, lipids    Stable resp status    PSA in October with urology - stable levels. Mood is stable on current meds. Nursing screenings reviewed by provider at visit. Past medical, Social, and Family history reviewed  Medications reviewed and updated. Allergies   Allergen Reactions    Hydrochlorothiazide Other (comments)     hyponatremia       Prior to Admission medications    Medication Sig Start Date End Date Taking? Authorizing Provider   sertraline (ZOLOFT) 50 mg tablet TAKE 1 TABLET DAILY FOR    ANXIETY WITH DEPRESSION 6/6/21  Yes Edy Daniels MD   divalproex DR (DEPAKOTE) 250 mg tablet TAKE 1 TABLET TWICE A DAY  FOR BIPOLAR DISORDER IN    REMISSION 6/6/21  Yes Edy Daniels MD   omeprazole (PRILOSEC) 20 mg capsule TAKE 1 CAPSULE DAILY 5/13/21  Yes Edy Daniels MD   lisinopriL (PRINIVIL, ZESTRIL) 40 mg tablet TAKE 1/2 TABLET TWICE A DAY 4/22/21  Yes Edy Daniels MD   amLODIPine (NORVASC) 10 mg tablet TAKE 1 TABLET DAILY 4/22/21  Yes Edy Daniels MD   fluticasone propion-salmeteroL (ADVAIR/WIXELA) 250-50 mcg/dose diskus inhaler Take 1 Puff by inhalation two (2) times a day. 10/21/20  Yes Edy Daniels MD   albuterol (ProAir HFA) 90 mcg/actuation inhaler Take 2 Puffs by inhalation every four (4) hours as needed for Wheezing. 8/18/20  Yes Edy Daniels MD   rosuvastatin (CRESTOR) 20 mg tablet TAKE 1 TABLET NIGHTLY 8/7/20  Yes Edy Daniels MD   prednisoLONE acetate (PRED FORTE) 1 % ophthalmic suspension Administer 1 Drop to left eye daily. Yes Provider, Historical   cholecalciferol (VITAMIN D3) 1,000 unit tablet Take 1 Tab by mouth daily. 11/13/18  Yes Edy Daniels MD   tamsulosin (FLOMAX) 0.4 mg capsule Take 1 Cap by mouth daily.  5/10/17  Yes Edy Daniels MD acyclovir (ZOVIRAX) 200 mg capsule Take  by mouth every four (4) hours (while awake). Yes Provider, Historical   montelukast (SINGULAIR) 10 mg tablet Take 1 Tab by mouth daily. 5/11/18   Yinka Patricio MD         ROS     A complete ROS was performed and negative except as noted in HPI     PHYSICAL EXAMINATION:    Vital Signs: There were no vitals taken for this visit. Patient-Reported Vitals 6/22/2021   Patient-Reported Weight 215   Patient-Reported Height 5'8\"   Patient-Reported Pulse 75   Patient-Reported Temperature 98.3   Patient-Reported Systolic  318   Patient-Reported Diastolic 78         Constitutional: [x] Appears well-developed and well-nourished [x] No apparent distress      Mental status: [x] Alert and awake  [x] Oriented [x] Able to follow commands       Eyes:   EOM    [x]  Normal      Sclera  [x]  Normal              Discharge [x]  None visible       HENT: [x] Normocephalic, atraumatic    [x] Mouth/Throat: Mucous membranes are moist    External Ears [x] Normal      Neck: [x] No visualized mass     Pulmonary/Chest: [x] Respiratory effort normal   [x] No visualized signs of difficulty breathing or respiratory distress    Musculoskeletal:  [x] Normal range of motion of neck    Neurological:        [x] No Facial Asymmetry (Cranial nerve 7 motor function) (limited exam due to video visit)          [x] No gaze palsy     Skin:        [x] No significant exanthematous lesions or discoloration noted on facial skin             Psychiatric:       [x] Normal Affect       Other pertinent observable physical exam findings:  None. We discussed the expected course, resolution and complications of the diagnosis(es) in detail. Medication risks, benefits, costs, interactions, and alternatives were discussed as indicated. I advised him to contact the office if his condition worsens, changes or fails to improve as anticipated. He expressed understanding with the diagnosis(es) and plan.      Qamar Peters is a 60 y.o. male who was evaluated by a video visit encounter for concerns as above. Ruth Zaidi is being evaluated by a Virtual Visit (video visit) encounter to address concerns as mentioned above. A caregiver was present when appropriate. Due to this being a TeleHealth encounter (During FELIO-37 public health emergency), evaluation of the following organ systems was limited: Vitals/Constitutional/EENT/Resp/CV/GI//MS/Neuro/Skin/Heme-Lymph-Imm. Pursuant to the emergency declaration under the [address] [place] [address] authority and the [employer] Act, this Virtual Visit was conducted with patient's (and/or legal guardian's) consent, to reduce the patient's risk of exposure to COVID-19 and provide necessary medical care. The patient (and/or legal guardian) has also been advised to contact this office for worsening conditions or problems, and seek emergency medical treatment and/or call 911 if deemed necessary. Patient identification was verified at the start of the visit: YES. Services were provided through a video synchronous discussion virtually to substitute for in-person clinic visit. Patient was located at their individual home (or other location as per patient preference). Provider was located in medical office. An electronic signature was used to authenticate this note.   -- Lilliam Herr MD

## 2021-06-29 ENCOUNTER — LAB ONLY (OUTPATIENT)
Dept: INTERNAL MEDICINE CLINIC | Age: 66
End: 2021-06-29

## 2021-06-29 DIAGNOSIS — R73.02 IGT (IMPAIRED GLUCOSE TOLERANCE): ICD-10-CM

## 2021-06-29 DIAGNOSIS — E78.5 DYSLIPIDEMIA: ICD-10-CM

## 2021-06-29 DIAGNOSIS — I10 ESSENTIAL HYPERTENSION WITH GOAL BLOOD PRESSURE LESS THAN 140/90: ICD-10-CM

## 2021-06-29 DIAGNOSIS — E55.9 VITAMIN D DEFICIENCY: ICD-10-CM

## 2021-06-29 DIAGNOSIS — F31.30 BIPOLAR DISORDER, MOST RECENT EPISODE DEPRESSED (HCC): ICD-10-CM

## 2021-06-29 DIAGNOSIS — K21.9 GASTROESOPHAGEAL REFLUX DISEASE WITHOUT ESOPHAGITIS: ICD-10-CM

## 2021-06-30 LAB
25(OH)D3 SERPL-MCNC: 35 NG/ML (ref 30–100)
ALBUMIN SERPL-MCNC: 3.7 G/DL (ref 3.5–5)
ALBUMIN/GLOB SERPL: 1.1 {RATIO} (ref 1.1–2.2)
ALP SERPL-CCNC: 92 U/L (ref 45–117)
ALT SERPL-CCNC: 14 U/L (ref 12–78)
ANION GAP SERPL CALC-SCNC: 7 MMOL/L (ref 5–15)
AST SERPL-CCNC: 8 U/L (ref 15–37)
BASOPHILS # BLD: 0.1 K/UL (ref 0–0.1)
BASOPHILS NFR BLD: 1 % (ref 0–1)
BILIRUB SERPL-MCNC: 0.4 MG/DL (ref 0.2–1)
BUN SERPL-MCNC: 16 MG/DL (ref 6–20)
BUN/CREAT SERPL: 21 (ref 12–20)
CALCIUM SERPL-MCNC: 9.1 MG/DL (ref 8.5–10.1)
CHLORIDE SERPL-SCNC: 103 MMOL/L (ref 97–108)
CHOLEST SERPL-MCNC: 134 MG/DL
CK SERPL-CCNC: 109 U/L (ref 39–308)
CO2 SERPL-SCNC: 26 MMOL/L (ref 21–32)
CREAT SERPL-MCNC: 0.75 MG/DL (ref 0.7–1.3)
DIFFERENTIAL METHOD BLD: ABNORMAL
EOSINOPHIL # BLD: 0.2 K/UL (ref 0–0.4)
EOSINOPHIL NFR BLD: 2 % (ref 0–7)
ERYTHROCYTE [DISTWIDTH] IN BLOOD BY AUTOMATED COUNT: 12.2 % (ref 11.5–14.5)
EST. AVERAGE GLUCOSE BLD GHB EST-MCNC: 123 MG/DL
GLOBULIN SER CALC-MCNC: 3.5 G/DL (ref 2–4)
GLUCOSE SERPL-MCNC: 90 MG/DL (ref 65–100)
HBA1C MFR BLD: 5.9 % (ref 4–5.6)
HCT VFR BLD AUTO: 40.6 % (ref 36.6–50.3)
HDLC SERPL-MCNC: 35 MG/DL
HDLC SERPL: 3.8 {RATIO} (ref 0–5)
HGB BLD-MCNC: 12.9 G/DL (ref 12.1–17)
IMM GRANULOCYTES # BLD AUTO: 0.1 K/UL (ref 0–0.04)
IMM GRANULOCYTES NFR BLD AUTO: 1 % (ref 0–0.5)
LDLC SERPL CALC-MCNC: 70.8 MG/DL (ref 0–100)
LYMPHOCYTES # BLD: 2.3 K/UL (ref 0.8–3.5)
LYMPHOCYTES NFR BLD: 21 % (ref 12–49)
MCH RBC QN AUTO: 31.5 PG (ref 26–34)
MCHC RBC AUTO-ENTMCNC: 31.8 G/DL (ref 30–36.5)
MCV RBC AUTO: 99.3 FL (ref 80–99)
MONOCYTES # BLD: 0.8 K/UL (ref 0–1)
MONOCYTES NFR BLD: 7 % (ref 5–13)
NEUTS SEG # BLD: 7.7 K/UL (ref 1.8–8)
NEUTS SEG NFR BLD: 68 % (ref 32–75)
NRBC # BLD: 0 K/UL (ref 0–0.01)
NRBC BLD-RTO: 0 PER 100 WBC
PLATELET # BLD AUTO: 256 K/UL (ref 150–400)
PMV BLD AUTO: 11.6 FL (ref 8.9–12.9)
POTASSIUM SERPL-SCNC: 4.3 MMOL/L (ref 3.5–5.1)
PROT SERPL-MCNC: 7.2 G/DL (ref 6.4–8.2)
RBC # BLD AUTO: 4.09 M/UL (ref 4.1–5.7)
SODIUM SERPL-SCNC: 136 MMOL/L (ref 136–145)
TRIGL SERPL-MCNC: 141 MG/DL (ref ?–150)
VALPROATE SERPL-MCNC: 47 UG/ML (ref 50–100)
VLDLC SERPL CALC-MCNC: 28.2 MG/DL
WBC # BLD AUTO: 10.9 K/UL (ref 4.1–11.1)

## 2021-08-23 DIAGNOSIS — E78.5 DYSLIPIDEMIA: ICD-10-CM

## 2021-08-23 RX ORDER — ROSUVASTATIN CALCIUM 20 MG/1
20 TABLET, COATED ORAL
Qty: 90 TABLET | Refills: 1 | Status: SHIPPED | OUTPATIENT
Start: 2021-08-23 | End: 2022-02-07 | Stop reason: SDUPTHER

## 2021-08-23 NOTE — TELEPHONE ENCOUNTER
Last visit 06/28/2021 Virtual visit MD Sheryle Gala   Next appointment 12/28/2021 MD Sheryle Gala   Previous refill encounter(s)   08/07/2020 Crestor #90 with 3 refills     Requested Prescriptions     Pending Prescriptions Disp Refills    rosuvastatin (CRESTOR) 20 mg tablet 90 Tablet 1     Sig: Take 1 Tablet by mouth nightly.

## 2021-09-27 RX ORDER — ALBUTEROL SULFATE 90 UG/1
2 AEROSOL, METERED RESPIRATORY (INHALATION)
Qty: 3 EACH | Refills: 4 | Status: SHIPPED | OUTPATIENT
Start: 2021-09-27 | End: 2022-09-30 | Stop reason: SDUPTHER

## 2021-10-20 RX ORDER — FLUTICASONE PROPIONATE AND SALMETEROL 250; 50 UG/1; UG/1
1 POWDER RESPIRATORY (INHALATION) 2 TIMES DAILY
Qty: 3 EACH | Refills: 3 | Status: SHIPPED | OUTPATIENT
Start: 2021-10-20 | End: 2022-09-30 | Stop reason: SDUPTHER

## 2021-11-29 DIAGNOSIS — F31.30 BIPOLAR DISORDER, MOST RECENT EPISODE DEPRESSED (HCC): ICD-10-CM

## 2021-11-29 DIAGNOSIS — F41.9 ANXIETY DISORDER, UNSPECIFIED TYPE: ICD-10-CM

## 2021-11-30 RX ORDER — SERTRALINE HYDROCHLORIDE 50 MG/1
TABLET, FILM COATED ORAL
Qty: 90 TABLET | Refills: 1 | Status: SHIPPED | OUTPATIENT
Start: 2021-11-30 | End: 2022-05-09 | Stop reason: SDUPTHER

## 2021-11-30 RX ORDER — DIVALPROEX SODIUM 250 MG/1
TABLET, DELAYED RELEASE ORAL
Qty: 180 TABLET | Refills: 1 | Status: SHIPPED | OUTPATIENT
Start: 2021-11-30 | End: 2022-05-09 | Stop reason: SDUPTHER

## 2021-11-30 NOTE — TELEPHONE ENCOUNTER
Last visit 06/28/2021 Virtual visit MD Henry Lovell  Next appointment 12/28/2021 MD Henry Lovell   Previous refill encounter(s)   06/06/2021:  - Depakote-ER #180 with 1 refill,  - Zoloft #90 with 1 refill.      Requested Prescriptions     Pending Prescriptions Disp Refills    sertraline (ZOLOFT) 50 mg tablet 90 Tablet 1     Sig: TAKE 1 TABLET DAILY FOR  ANXIETY WITH DEPRESSION    divalproex DR (DEPAKOTE) 250 mg tablet 180 Tablet 0     Sig: TAKE 1 TABLET TWICE A DAY  FOR BIPOLAR DISORDER IN    REMISSION

## 2021-12-28 ENCOUNTER — OFFICE VISIT (OUTPATIENT)
Dept: INTERNAL MEDICINE CLINIC | Age: 66
End: 2021-12-28
Payer: MEDICARE

## 2021-12-28 VITALS — RESPIRATION RATE: 16 BRPM | DIASTOLIC BLOOD PRESSURE: 76 MMHG | SYSTOLIC BLOOD PRESSURE: 130 MMHG | TEMPERATURE: 97.9 F

## 2021-12-28 DIAGNOSIS — J45.909 UNCOMPLICATED ASTHMA, UNSPECIFIED ASTHMA SEVERITY, UNSPECIFIED WHETHER PERSISTENT: ICD-10-CM

## 2021-12-28 DIAGNOSIS — E55.9 VITAMIN D DEFICIENCY: ICD-10-CM

## 2021-12-28 DIAGNOSIS — Z12.11 COLON CANCER SCREENING: ICD-10-CM

## 2021-12-28 DIAGNOSIS — F31.30 BIPOLAR DISORDER, MOST RECENT EPISODE DEPRESSED (HCC): ICD-10-CM

## 2021-12-28 DIAGNOSIS — E78.5 DYSLIPIDEMIA: ICD-10-CM

## 2021-12-28 DIAGNOSIS — Z00.00 INITIAL MEDICARE ANNUAL WELLNESS VISIT: Primary | ICD-10-CM

## 2021-12-28 DIAGNOSIS — Z23 ENCOUNTER FOR IMMUNIZATION: ICD-10-CM

## 2021-12-28 DIAGNOSIS — R73.02 IGT (IMPAIRED GLUCOSE TOLERANCE): ICD-10-CM

## 2021-12-28 DIAGNOSIS — I10 ESSENTIAL HYPERTENSION WITH GOAL BLOOD PRESSURE LESS THAN 140/90: ICD-10-CM

## 2021-12-28 PROCEDURE — 99214 OFFICE O/P EST MOD 30 MIN: CPT | Performed by: INTERNAL MEDICINE

## 2021-12-28 PROCEDURE — G8754 DIAS BP LESS 90: HCPCS | Performed by: INTERNAL MEDICINE

## 2021-12-28 PROCEDURE — G8427 DOCREV CUR MEDS BY ELIG CLIN: HCPCS | Performed by: INTERNAL MEDICINE

## 2021-12-28 PROCEDURE — G9717 DOC PT DX DEP/BP F/U NT REQ: HCPCS | Performed by: INTERNAL MEDICINE

## 2021-12-28 PROCEDURE — 90670 PCV13 VACCINE IM: CPT | Performed by: INTERNAL MEDICINE

## 2021-12-28 PROCEDURE — G0463 HOSPITAL OUTPT CLINIC VISIT: HCPCS | Performed by: INTERNAL MEDICINE

## 2021-12-28 PROCEDURE — G0438 PPPS, INITIAL VISIT: HCPCS | Performed by: INTERNAL MEDICINE

## 2021-12-28 PROCEDURE — 1101F PT FALLS ASSESS-DOCD LE1/YR: CPT | Performed by: INTERNAL MEDICINE

## 2021-12-28 PROCEDURE — G8752 SYS BP LESS 140: HCPCS | Performed by: INTERNAL MEDICINE

## 2021-12-28 PROCEDURE — 3017F COLORECTAL CA SCREEN DOC REV: CPT | Performed by: INTERNAL MEDICINE

## 2021-12-28 PROCEDURE — G8417 CALC BMI ABV UP PARAM F/U: HCPCS | Performed by: INTERNAL MEDICINE

## 2021-12-28 PROCEDURE — G8536 NO DOC ELDER MAL SCRN: HCPCS | Performed by: INTERNAL MEDICINE

## 2021-12-28 NOTE — PROGRESS NOTES
This is an Initial Medicare Annual Wellness Exam (AWV) (Performed 12 months after IPPE or effective date of Medicare Part B enrollment, Once in a lifetime)    I have reviewed the patient's medical history in detail and updated the computerized patient record. Assessment/Plan   Education and counseling provided:  Are appropriate based on today's review and evaluation    ICD-10-CM ICD-9-CM    1. Initial Medicare annual wellness visit  Z00.00 V70.0    2. Bipolar disorder, most recent episode depressed ([facility]  F31.30 296.50 VALPROIC ACID      VALPROIC ACID   3. Dyslipidemia  E78.5 899.5 CK      METABOLIC PANEL, COMPREHENSIVE      LIPID PANEL      CK      METABOLIC PANEL, COMPREHENSIVE      LIPID PANEL   4. Vitamin D deficiency  E55.9 268.9 VITAMIN D, 25 HYDROXY      VITAMIN D, 25 HYDROXY   5. IGT (impaired glucose tolerance)  R73.02 790.22 HEMOGLOBIN A1C WITH EAG      HEMOGLOBIN A1C WITH EAG   6. Essential hypertension with goal blood pressure less than 140/90  I10 401.9 CBC WITH AUTOMATED DIFF      CBC WITH AUTOMATED DIFF   7. Uncomplicated asthma, unspecified asthma severity, unspecified whether persistent  J45.909 493.90    8. Encounter for immunization  Z23 V03.89 PNEUMOCOCCAL CONJ VACCINE 13 VALENT IM   9. Colon cancer screening  Z12.11 V76.51 COLOGUARD TEST (FECAL DNA COLORECTAL CANCER SCREENING)     Follow-up and Dispositions    · Return in about 6 months (around 6/28/2022), or if symptoms worsen or fail to improve, for blood pressure, cholesterol, asthma. results and schedule of future studies reviewed with patient  reviewed diet, exercise and weight   cardiovascular risk and specific lipid/LDL goals reviewed  reviewed medications and side effects in detail      Cologuard is an option since no risk factors, just mechanical stricture.   Fasting labs  prevnar 13 then pneumovax 23 in 1 year (per medicare)      Depression Risk Factor Screening     3 most recent PHQ Screens 12/28/2021   Little interest or pleasure in doing things Not at all   Feeling down, depressed, irritable, or hopeless Not at all   Total Score PHQ 2 0       Alcohol & Drug Abuse Risk Screen    Do you average more than 1 drink per night or more than 7 drinks a week: No    In the past three months have you have had more than 4 drinks containing alcohol on one occasion: No          Functional Ability and Level of Safety    Hearing: Hearing is good. Activities of Daily Living: The home contains: no safety equipment. Patient does total self care     Ambulation: with no difficulty      Fall Risk:  Fall Risk Assessment, last 12 mths 12/28/2021   Able to walk? Yes   Fall in past 12 months? 0   Do you feel unsteady?  0   Are you worried about falling 0      Abuse Screen:  Patient is not abused       Cognitive Screening    Has your family/caregiver stated any concerns about your memory: no       Health Maintenance Due     Health Maintenance Due   Topic Date Due    Pneumococcal 65+ years (1 of 1 - PPSV23) 08/11/2021       Patient Care Team   Patient Care Team:  Beatris Marroquin MD as PCP - General (Internal Medicine)  Beatris Marroquin MD as PCP - REHABILITATION [facility] Empaneled Provider  Aashish Luther MD (Gastroenterology)  Beau Cruz MD (Urology)    History     Patient Active Problem List   Diagnosis Code    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    Glaucoma H40.9    HTN (hypertension) I10    Hemorrhoids, external K64.4    Heme + stool R19.5    GERD (gastroesophageal reflux disease) K21.9    Leukocytosis, unspecified D72.829    Anemia D64.9    IGT (impaired glucose tolerance) R73.02    Iron deficiency anemia D50.9    Stricture of colon ([facility] K56.699    Dyslipidemia E78.5    Hyponatremia E87.1    Benign prostatic hyperplasia with nocturia N40.1, R35.1    Depression with anxiety F41.8    Asthma J45.909    Bipolar disorder, current episode depressed, moderate (HCC) F31.32    Anxiety disorder F41.9    Cataract H26.9    Plantar fasciitis M72.2    Bipolar disorder, most recent episode depressed ([facility] F31.30     Past Medical History:   Diagnosis Date    Asthma     Atherosclerosis of aorta (HCC)     on CT - no aneurysm - 8/12    BPH (benign prostatic hypertrophy) 8/23/2012    Cataract     Cholelithiasis     CT colonography 8/12    DDD (degenerative disc disease), lumbar     CT 8/12    Diverticulitis of sigmoid colon     with contained perforation - CT colonography 8/12    Diverticulosis of colon     Diverticulosis of sigmoid     with narrowing - 2/11    Dyslipidemia 2/3/2012    Esophageal ring     EGD - 9/30/11    Gastritis     EGD - 9/30/11    GERD (gastroesophageal reflux disease) 2/8/2011    Glaucoma     Hemorrhoids, external 2/8/2011    Herpes simplex of eye     Dr. Escobedo Divers History of colonoscopy 12/13/2017    Severe diverticulosis left colon--unable to advance past sigmoid colon. GI planned single contrast barium enema. Dr. Silvia Panchal.  HTN (hypertension)     IGT (impaired glucose tolerance)     Iron deficiency anemia 9/7/2011    LVH (left ventricular hypertrophy)     Renal cyst     left - CT 8/12    Stricture of colon (HCC)     sigmoid, descending junction    Subluxation of right lens       Past Surgical History:   Procedure Laterality Date    HX CATARACT REMOVAL  12/3/13    left    HX CATARACT REMOVAL  12/2013     Current Outpatient Medications   Medication Sig Dispense Refill    sertraline (ZOLOFT) 50 mg tablet TAKE 1 TABLET DAILY FOR  ANXIETY WITH DEPRESSION 90 Tablet 1    divalproex DR (DEPAKOTE) 250 mg tablet TAKE 1 TABLET TWICE A DAY  FOR BIPOLAR DISORDER IN    REMISSION 180 Tablet 1    fluticasone propion-salmeteroL (ADVAIR/WIXELA) 250-50 mcg/dose diskus inhaler Take 1 Puff by inhalation two (2) times a day. 3 Each 3    albuterol (ProAir HFA) 90 mcg/actuation inhaler Take 2 Puffs by inhalation every four (4) hours as needed for Wheezing.  3 Each 4    rosuvastatin (CRESTOR) 20 mg tablet Take 1 Tablet by mouth nightly. 90 Tablet 1    omeprazole (PRILOSEC) 20 mg capsule TAKE 1 CAPSULE DAILY 90 Cap 3    lisinopriL (PRINIVIL, ZESTRIL) 40 mg tablet TAKE 1/2 TABLET TWICE A DAY 90 Tab 3    amLODIPine (NORVASC) 10 mg tablet TAKE 1 TABLET DAILY 90 Tab 3    prednisoLONE acetate (PRED FORTE) 1 % ophthalmic suspension Administer 1 Drop to left eye daily.  cholecalciferol (VITAMIN D3) 1,000 unit tablet Take 1 Tab by mouth daily. 90 Tab 3    tamsulosin (FLOMAX) 0.4 mg capsule Take 1 Cap by mouth daily. 90 Cap 3    acyclovir (ZOVIRAX) 200 mg capsule Take  by mouth every four (4) hours (while awake).  montelukast (SINGULAIR) 10 mg tablet Take 1 Tab by mouth daily.  90 Tab 3     Allergies   Allergen Reactions    Hydrochlorothiazide Other (comments)     hyponatremia       Family History   Problem Relation Age of Onset    Cancer Mother         lung    Hypertension Father     Cancer Sister     Hypertension Sister     Hypertension Brother      Social History     Tobacco Use    Smoking status: Former Smoker     Packs/day: 0.50     Types: Cigarettes     Quit date: 1980     Years since quittin.0    Smokeless tobacco: Never Used   Substance Use Topics    Alcohol use: No       Ade Cabrales MD

## 2021-12-28 NOTE — PROGRESS NOTES
HPI:  established patient  Presents for f/u asthma, HTN, etc    BP at home is better - tere in the afternoon  Always a little higher in the AM    Breathing is stable overall. Intermittent congestion. PSA at urology stable. 0.979 10/25/21    No colon polps  +stricture due to diverticulitis  No family history. Using metamucil. Past medical, Social, and Family history reviewed    Prior to Admission medications    Medication Sig Start Date End Date Taking? Authorizing Provider   sertraline (ZOLOFT) 50 mg tablet TAKE 1 TABLET DAILY FOR  ANXIETY WITH DEPRESSION 11/30/21  Yes Daniel Bryson MD   divalproex DR (DEPAKOTE) 250 mg tablet TAKE 1 TABLET TWICE A DAY  FOR BIPOLAR DISORDER IN    REMISSION 11/30/21  Yes Daniel Bryson MD   fluticasone propion-salmeteroL (ADVAIR/WIXELA) 250-50 mcg/dose diskus inhaler Take 1 Puff by inhalation two (2) times a day. 10/20/21  Yes Daniel Bryson MD   albuterol (ProAir HFA) 90 mcg/actuation inhaler Take 2 Puffs by inhalation every four (4) hours as needed for Wheezing. 9/27/21  Yes Daniel Bryson MD   rosuvastatin (CRESTOR) 20 mg tablet Take 1 Tablet by mouth nightly. 8/23/21  Yes Daniel Bryson MD   omeprazole (PRILOSEC) 20 mg capsule TAKE 1 CAPSULE DAILY 5/13/21  Yes Daniel Bryson MD   lisinopriL (PRINIVIL, ZESTRIL) 40 mg tablet TAKE 1/2 TABLET TWICE A DAY 4/22/21  Yes Daniel Bryson MD   amLODIPine (NORVASC) 10 mg tablet TAKE 1 TABLET DAILY 4/22/21  Yes Daniel Bryson MD   prednisoLONE acetate (PRED FORTE) 1 % ophthalmic suspension Administer 1 Drop to left eye daily. Yes Provider, Historical   cholecalciferol (VITAMIN D3) 1,000 unit tablet Take 1 Tab by mouth daily. 11/13/18  Yes Daniel Bryson MD   tamsulosin (FLOMAX) 0.4 mg capsule Take 1 Cap by mouth daily. 5/10/17  Yes Daniel Bryson MD   acyclovir (ZOVIRAX) 200 mg capsule Take  by mouth every four (4) hours (while awake).    Yes Provider, Historical   montelukast (SINGULAIR) 10 mg tablet Take 1 Tab by mouth daily. 5/11/18   Yosef Lai MD          ROS  Complete ROS reviewed and negative or stable except as noted in HPI. Physical Exam  Vitals and nursing note reviewed. Constitutional:       General: He is not in acute distress. HENT:      Head: Normocephalic and atraumatic. Eyes:      General: No scleral icterus. Pupils: Pupils are equal, round, and reactive to light. Neck:      Vascular: No JVD. Cardiovascular:      Rate and Rhythm: Normal rate and regular rhythm. Heart sounds: Normal heart sounds. No murmur heard. No friction rub. No gallop. Pulmonary:      Effort: Pulmonary effort is normal. No respiratory distress. Breath sounds: Rhonchi present. No wheezing or rales. Abdominal:      General: There is no distension. Palpations: Abdomen is soft. Tenderness: There is no abdominal tenderness. Musculoskeletal:         General: Normal range of motion. Cervical back: Normal range of motion and neck supple. Lymphadenopathy:      Cervical: No cervical adenopathy. Skin:     General: Skin is warm. Findings: No rash. Neurological:      Mental Status: He is alert and oriented to person, place, and time. Motor: No abnormal muscle tone. Prior labs reviewed. Assessment/Plan:    ICD-10-CM ICD-9-CM    1. Essential hypertension with goal blood pressure less than 140/90  I10 401.9 CBC WITH AUTOMATED DIFF      CBC WITH AUTOMATED DIFF   2. Initial Medicare annual wellness visit  Z00.00 V70.0    3. Bipolar disorder, most recent episode depressed ([facility] [address]  F31.30 296.50 VALPROIC ACID      VALPROIC ACID   4. Dyslipidemia  E78.5 403.6 CK      METABOLIC PANEL, COMPREHENSIVE      LIPID PANEL      CK      METABOLIC PANEL, COMPREHENSIVE      LIPID PANEL   5. Vitamin D deficiency  E55.9 268.9 VITAMIN D, 25 HYDROXY      VITAMIN D, 25 HYDROXY   6.  IGT (impaired glucose tolerance)  R73.02 790.22 HEMOGLOBIN A1C WITH EAG HEMOGLOBIN A1C WITH EAG   7. Uncomplicated asthma, unspecified asthma severity, unspecified whether persistent  J45.909 493.90    8. Encounter for immunization  Z23 V03.89 PNEUMOCOCCAL CONJ VACCINE 13 VALENT IM   9. Colon cancer screening  Z12.11 V76.51 COLOGUARD TEST (FECAL DNA COLORECTAL CANCER SCREENING)     Follow-up and Dispositions    · Return in about 6 months (around 6/28/2022), or if symptoms worsen or fail to improve, for blood pressure, cholesterol, asthma. results and schedule of future studies reviewed with patient  reviewed diet, exercise and weight   cardiovascular risk and specific lipid/LDL goals reviewed  reviewed medications and side effects in detail    Cologuard is an option since no risk factors, just mechanical stricture. Fasting labs  prevnar 13 then pneumovax 23 in 1 year (per medicare)  Continue current medications       An electronic signature was used to authenticate this note.   -- Rachael Lesches, MD

## 2021-12-28 NOTE — PATIENT INSTRUCTIONS
Medicare Wellness Visit, Male    The best way to live healthy is to have a lifestyle where you eat a well-balanced diet, exercise regularly, limit alcohol use, and quit all forms of tobacco/nicotine, if applicable. Regular preventive services are another way to keep healthy. Preventive services (vaccines, screening tests, monitoring & exams) can help personalize your care plan, which helps you manage your own care. Screening tests can find health problems at the earliest stages, when they are easiest to treat. [facility] follows the current, evidence-based guidelines published by the [facility] ([facility]) when recommending preventive services for our patients. Because we follow these guidelines, sometimes recommendations change over time as research supports it. (For example, a prostate screening blood test is no longer routinely recommended for men with no symptoms). Of course, you and your doctor may decide to screen more often for some diseases, based on your risk and co-morbidities (chronic disease you are already diagnosed with). Preventive services for you include:  - Medicare offers their members a free annual wellness visit, which is time for you and your primary care provider to discuss and plan for your preventive service needs. Take advantage of this benefit every year!  -All adults over age 72 should receive the recommended pneumonia vaccines. Current USPSTF guidelines recommend a series of two vaccines for the best pneumonia protection.   -All adults should have a flu vaccine yearly and tetanus vaccine every 10 years.  -All adults age 48 and older should receive the shingles vaccines (series of two vaccines).        -All adults age 38-68 who are overweight should have a diabetes screening test once every three years.   -Other screening tests & preventive services for persons with diabetes include: an eye exam to screen for diabetic retinopathy, a kidney function test, a foot exam, and stricter control over your cholesterol.   -Cardiovascular screening for adults with routine risk involves an electrocardiogram (ECG) at intervals determined by the provider.   -Colorectal cancer screening should be done for adults age 54-65 with no increased risk factors for colorectal cancer. There are a number of acceptable methods of screening for this type of cancer. Each test has its own benefits and drawbacks. Discuss with your provider what is most appropriate for you during your annual wellness visit. The different tests include: colonoscopy (considered the best screening method), a fecal occult blood test, a fecal DNA test, and sigmoidoscopy.  -All adults born between [facility] should be screened once for Hepatitis C.  -An Abdominal Aortic Aneurysm (AAA) Screening is recommended for men age 73-68 who has ever smoked in their lifetime.      Here is a list of your current Health Maintenance items (your personalized list of preventive services) with a due date:  Health Maintenance Due   Topic Date Due    Pneumococcal Vaccine (1 of 1 - PPSV23) 08/11/2021

## 2021-12-29 LAB
25(OH)D3 SERPL-MCNC: 45.3 NG/ML (ref 30–100)
ALBUMIN SERPL-MCNC: 3.8 G/DL (ref 3.5–5)
ALBUMIN/GLOB SERPL: 1 {RATIO} (ref 1.1–2.2)
ALP SERPL-CCNC: 98 U/L (ref 45–117)
ALT SERPL-CCNC: 16 U/L (ref 12–78)
ANION GAP SERPL CALC-SCNC: 6 MMOL/L (ref 5–15)
AST SERPL-CCNC: 6 U/L (ref 15–37)
BASOPHILS # BLD: 0.1 K/UL (ref 0–0.1)
BASOPHILS NFR BLD: 1 % (ref 0–1)
BILIRUB SERPL-MCNC: 0.4 MG/DL (ref 0.2–1)
BUN SERPL-MCNC: 18 MG/DL (ref 6–20)
BUN/CREAT SERPL: 20 (ref 12–20)
CALCIUM SERPL-MCNC: 9.2 MG/DL (ref 8.5–10.1)
CHLORIDE SERPL-SCNC: 103 MMOL/L (ref 97–108)
CHOLEST SERPL-MCNC: 122 MG/DL
CK SERPL-CCNC: 74 U/L (ref 39–308)
CO2 SERPL-SCNC: 27 MMOL/L (ref 21–32)
CREAT SERPL-MCNC: 0.89 MG/DL (ref 0.7–1.3)
DIFFERENTIAL METHOD BLD: ABNORMAL
EOSINOPHIL # BLD: 0.2 K/UL (ref 0–0.4)
EOSINOPHIL NFR BLD: 2 % (ref 0–7)
ERYTHROCYTE [DISTWIDTH] IN BLOOD BY AUTOMATED COUNT: 12.4 % (ref 11.5–14.5)
EST. AVERAGE GLUCOSE BLD GHB EST-MCNC: 123 MG/DL
GLOBULIN SER CALC-MCNC: 3.8 G/DL (ref 2–4)
GLUCOSE SERPL-MCNC: 105 MG/DL (ref 65–100)
HBA1C MFR BLD: 5.9 % (ref 4–5.6)
HCT VFR BLD AUTO: 43.2 % (ref 36.6–50.3)
HDLC SERPL-MCNC: 33 MG/DL
HDLC SERPL: 3.7 {RATIO} (ref 0–5)
HGB BLD-MCNC: 13.7 G/DL (ref 12.1–17)
IMM GRANULOCYTES # BLD AUTO: 0.1 K/UL (ref 0–0.04)
IMM GRANULOCYTES NFR BLD AUTO: 1 % (ref 0–0.5)
LDLC SERPL CALC-MCNC: 66.2 MG/DL (ref 0–100)
LYMPHOCYTES # BLD: 2.4 K/UL (ref 0.8–3.5)
LYMPHOCYTES NFR BLD: 21 % (ref 12–49)
MCH RBC QN AUTO: 31.9 PG (ref 26–34)
MCHC RBC AUTO-ENTMCNC: 31.7 G/DL (ref 30–36.5)
MCV RBC AUTO: 100.5 FL (ref 80–99)
MONOCYTES # BLD: 1 K/UL (ref 0–1)
MONOCYTES NFR BLD: 9 % (ref 5–13)
NEUTS SEG # BLD: 8 K/UL (ref 1.8–8)
NEUTS SEG NFR BLD: 66 % (ref 32–75)
NRBC # BLD: 0 K/UL (ref 0–0.01)
NRBC BLD-RTO: 0 PER 100 WBC
PLATELET # BLD AUTO: 283 K/UL (ref 150–400)
PMV BLD AUTO: 10.7 FL (ref 8.9–12.9)
POTASSIUM SERPL-SCNC: 4.7 MMOL/L (ref 3.5–5.1)
PROT SERPL-MCNC: 7.6 G/DL (ref 6.4–8.2)
RBC # BLD AUTO: 4.3 M/UL (ref 4.1–5.7)
SODIUM SERPL-SCNC: 136 MMOL/L (ref 136–145)
TRIGL SERPL-MCNC: 114 MG/DL (ref ?–150)
VALPROATE SERPL-MCNC: 53 UG/ML (ref 50–100)
VLDLC SERPL CALC-MCNC: 22.8 MG/DL
WBC # BLD AUTO: 11.8 K/UL (ref 4.1–11.1)

## 2021-12-30 NOTE — PROGRESS NOTES
Labs are either normal or stable and at goal.  Keep up the good work!   Continue current medications

## 2022-01-17 ENCOUNTER — PATIENT MESSAGE (OUTPATIENT)
Dept: INTERNAL MEDICINE CLINIC | Age: 67
End: 2022-01-17

## 2022-02-07 DIAGNOSIS — E78.5 DYSLIPIDEMIA: ICD-10-CM

## 2022-02-08 RX ORDER — ROSUVASTATIN CALCIUM 20 MG/1
20 TABLET, COATED ORAL
Qty: 90 TABLET | Refills: 1 | Status: SHIPPED | OUTPATIENT
Start: 2022-02-08 | End: 2022-08-10 | Stop reason: SDUPTHER

## 2022-02-08 NOTE — TELEPHONE ENCOUNTER
Last visit 12/28/2021 MD Manjit Guzman   Next appointment 06/28/2022 MD Manjit Guzman   Previous refill encounter(s)   08/23/2021 Crestor #90 with 1 refill     Requested Prescriptions     Pending Prescriptions Disp Refills    rosuvastatin (CRESTOR) 20 mg tablet 90 Tablet 1     Sig: Take 1 Tablet by mouth nightly.

## 2022-03-07 NOTE — PROGRESS NOTES
Impression: Diabetes mellitus Type 2 without mention of complication: Y62.6. Plan: Discussed the pathophysiology of diabetes and its effect on the eye. Stressed the importance of strong glucose control. Advised of importance of scheduled dilated examinations, and to contact us immediately for any problems or concerns. Patient was referred to a retina specialist for further evaluation and management. Fundus photos taken. Discussed the pathophysiology of diabetes and its effect on the eye. Stressed the importance of strong glucose control. Advised of importance of scheduled dilated examinations, and to contact us immediately for any problems or concerns. Rm 13    Chief Complaint   Patient presents with    Blood Pressure Check    Asthma         1. Have you been to the ER, urgent care clinic since your last visit? Hospitalized since your last visit? No    2. Have you seen or consulted any other health care providers outside of the [address] [place] [address] since your last visit? Include any pap smears or colon screening. No        Health Maintenance Due   Topic Date Due    Pneumococcal 65+ years (1 of 1 - PPSV23) 08/11/2021           3 most recent PHQ Screens 12/28/2021   Little interest or pleasure in doing things Not at all   Feeling down, depressed, irritable, or hopeless Not at all   Total Score PHQ 2 0     Fall Risk Assessment, last 12 mths 12/28/2021   Able to walk? Yes   Fall in past 12 months? 0   Do you feel unsteady? 0   Are you worried about falling 0         Learning Assessment 4/1/2019   PRIMARY LEARNER Patient   HIGHEST LEVEL OF EDUCATION - PRIMARY LEARNER  -   BARRIERS PRIMARY LEARNER -   CO-LEARNER CAREGIVER -   PRIMARY LANGUAGE ENGLISH   LEARNER PREFERENCE PRIMARY OTHER (COMMENT)   ANSWERED BY patient    RELATIONSHIP SELF         An After Visit Summary was printed and given to the patient.

## 2022-04-25 DIAGNOSIS — I10 ESSENTIAL HYPERTENSION WITH GOAL BLOOD PRESSURE LESS THAN 140/90: ICD-10-CM

## 2022-04-25 RX ORDER — AMLODIPINE BESYLATE 10 MG/1
10 TABLET ORAL DAILY
Qty: 90 TABLET | Refills: 1 | Status: SHIPPED | OUTPATIENT
Start: 2022-04-25 | End: 2022-09-30 | Stop reason: SDUPTHER

## 2022-04-25 RX ORDER — LISINOPRIL 40 MG/1
20 TABLET ORAL 2 TIMES DAILY
Qty: 90 TABLET | Refills: 1 | Status: SHIPPED | OUTPATIENT
Start: 2022-04-25 | End: 2022-09-30 | Stop reason: SDUPTHER

## 2022-04-25 NOTE — TELEPHONE ENCOUNTER
Last visit 12/28/2021 MD Deion Fitzgerald   Next appointment 06/28/2022 MD Deion Fitzgerald  Previous refill encounter(s)   04/22/2021:  - Norvasc #90 with 3 refills,  - Prinivil #90 with 3 refills. Requested Prescriptions     Pending Prescriptions Disp Refills    lisinopriL (PRINIVIL, ZESTRIL) 40 mg tablet 90 Tablet 0     Sig: Take 0.5 Tablets by mouth two (2) times a day.  amLODIPine (NORVASC) 10 mg tablet 90 Tablet 0     Sig: Take 1 Tablet by mouth daily.

## 2022-05-09 DIAGNOSIS — F31.30 BIPOLAR DISORDER, MOST RECENT EPISODE DEPRESSED (HCC): ICD-10-CM

## 2022-05-09 DIAGNOSIS — F41.9 ANXIETY DISORDER, UNSPECIFIED TYPE: ICD-10-CM

## 2022-05-09 RX ORDER — OMEPRAZOLE 20 MG/1
20 CAPSULE, DELAYED RELEASE ORAL DAILY
Qty: 90 CAPSULE | Refills: 1 | Status: SHIPPED | OUTPATIENT
Start: 2022-05-09 | End: 2022-09-30 | Stop reason: SDUPTHER

## 2022-05-09 RX ORDER — SERTRALINE HYDROCHLORIDE 50 MG/1
TABLET, FILM COATED ORAL
Qty: 90 TABLET | Refills: 1 | Status: SHIPPED | OUTPATIENT
Start: 2022-05-09 | End: 2022-09-30 | Stop reason: SDUPTHER

## 2022-05-09 RX ORDER — DIVALPROEX SODIUM 250 MG/1
TABLET, DELAYED RELEASE ORAL
Qty: 180 TABLET | Refills: 1 | Status: SHIPPED | OUTPATIENT
Start: 2022-05-09 | End: 2022-09-30 | Stop reason: SDUPTHER

## 2022-05-09 NOTE — TELEPHONE ENCOUNTER
Last visit 12/28/2021 MD Espinoza   Next appointment 06/28/2022 MD Espinoza  Previous refill encounter(s)   05/13/2021 Prilosec #90 with 3 refills,  11/30/2021   - Depakote-DR #180 with 1 refill,  - Zoloft #90 with 1 refill. Requested Prescriptions     Pending Prescriptions Disp Refills    omeprazole (PRILOSEC) 20 mg capsule 90 Capsule 0     Sig: Take 1 Capsule by mouth daily.     sertraline (ZOLOFT) 50 mg tablet 90 Tablet 0     Sig: TAKE 1 TABLET DAILY FOR  ANXIETY WITH DEPRESSION    divalproex  (DEPAKOTE) 250 mg tablet 180 Tablet 0     Sig: TAKE 1 TABLET TWICE A DAY  FOR BIPOLAR DISORDER IN    REMISSION

## 2022-06-28 ENCOUNTER — VIRTUAL VISIT (OUTPATIENT)
Dept: INTERNAL MEDICINE CLINIC | Age: 67
End: 2022-06-28
Payer: MEDICARE

## 2022-06-28 DIAGNOSIS — F31.30 BIPOLAR DISORDER, MOST RECENT EPISODE DEPRESSED (HCC): Primary | ICD-10-CM

## 2022-06-28 DIAGNOSIS — I10 ESSENTIAL HYPERTENSION: ICD-10-CM

## 2022-06-28 DIAGNOSIS — E55.9 VITAMIN D DEFICIENCY: ICD-10-CM

## 2022-06-28 DIAGNOSIS — E78.5 DYSLIPIDEMIA: ICD-10-CM

## 2022-06-28 DIAGNOSIS — R73.02 IGT (IMPAIRED GLUCOSE TOLERANCE): ICD-10-CM

## 2022-06-28 DIAGNOSIS — I10 ESSENTIAL HYPERTENSION WITH GOAL BLOOD PRESSURE LESS THAN 140/90: ICD-10-CM

## 2022-06-28 DIAGNOSIS — J45.909 UNCOMPLICATED ASTHMA, UNSPECIFIED ASTHMA SEVERITY, UNSPECIFIED WHETHER PERSISTENT: ICD-10-CM

## 2022-06-28 PROCEDURE — 1123F ACP DISCUSS/DSCN MKR DOCD: CPT | Performed by: INTERNAL MEDICINE

## 2022-06-28 PROCEDURE — G8756 NO BP MEASURE DOC: HCPCS | Performed by: INTERNAL MEDICINE

## 2022-06-28 PROCEDURE — 1101F PT FALLS ASSESS-DOCD LE1/YR: CPT | Performed by: INTERNAL MEDICINE

## 2022-06-28 PROCEDURE — G8427 DOCREV CUR MEDS BY ELIG CLIN: HCPCS | Performed by: INTERNAL MEDICINE

## 2022-06-28 PROCEDURE — G0463 HOSPITAL OUTPT CLINIC VISIT: HCPCS | Performed by: INTERNAL MEDICINE

## 2022-06-28 PROCEDURE — 99214 OFFICE O/P EST MOD 30 MIN: CPT | Performed by: INTERNAL MEDICINE

## 2022-06-28 PROCEDURE — G9717 DOC PT DX DEP/BP F/U NT REQ: HCPCS | Performed by: INTERNAL MEDICINE

## 2022-06-28 PROCEDURE — 3017F COLORECTAL CA SCREEN DOC REV: CPT | Performed by: INTERNAL MEDICINE

## 2022-06-28 NOTE — PROGRESS NOTES
VV  PT ready for visit in [facility]. Chief Complaint   Patient presents with    Hypertension    Cholesterol Problem    Asthma       There were no vitals taken for this visit. 3 most recent PHQ Screens 12/28/2021   Little interest or pleasure in doing things Not at all   Feeling down, depressed, irritable, or hopeless Not at all   Total Score PHQ 2 0         1. Have you been to the ER, urgent care clinic since your last visit? Hospitalized since your last visit? No    2. Have you seen or consulted any other health care providers outside of the [address] [place] [address] since your last visit? Include any pap smears or [facility] screening[facility]. There are no preventive care reminders to display for this patient. Learning Assessment 4/1/2019   PRIMARY LEARNER Patient   HIGHEST LEVEL OF EDUCATION - PRIMARY LEARNER  -   BARRIERS PRIMARY LEARNER -   CO-LEARNER CAREGIVER -   PRIMARY LANGUAGE ENGLISH   LEARNER PREFERENCE PRIMARY OTHER (COMMENT)   ANSWERED BY patient    RELATIONSHIP SELF         AVS  education, follow up, and recommendations provided and addressed with patient.   services used to advise patient -no

## 2022-06-28 NOTE — PROGRESS NOTES
Judie Butt (: 1955) is a 73 y.o. male, established patient, here for evaluation of the following chief complaint(s)--see below:    Judie Butt is a 73 y.o. male who was seen by synchronous (real-time) audio-video technology on 2022. Consent: Judie Butt, who was seen by synchronous (real-time) audio-video technology, and/or his healthcare decision maker, is aware that this patient-initiated, Telehealth encounter on 2022 is a billable service, with coverage as determined by his insurance carrier. He is aware that he may receive a bill and has provided verbal consent to proceed: Yes. I was in the office while conducting this encounter. Assessment & Plan:   Diagnoses and all orders for this visit:    Diagnoses and all orders for this visit:    1. Bipolar disorder, most recent episode depressed ([facility] [address]  -     VALPROIC ACID; Future    2. Essential hypertension with goal blood pressure less than 140/90    3. Dyslipidemia  -     CK; Future  -     METABOLIC PANEL, COMPREHENSIVE; Future  -     LIPID PANEL; Future    4. Vitamin D deficiency    5. IGT (impaired glucose tolerance)  -     HEMOGLOBIN A1C WITH EAG; Future    6. Uncomplicated asthma, unspecified asthma severity, unspecified whether persistent    7. Essential hypertension  -     CBC WITH AUTOMATED DIFF; Future      Follow-up and Dispositions    Return in about 6 months (around 2022), or if symptoms worsen or fail to improve, for blood pressure, asthma, Medicare Wellness Visit. pt declines any additional colon testing - informed decision  Continue current medications  Lab visit      AVS:  [x]  Available to patient in [address] after visit signed. []  Mailed to patient after visit. []  Not sent to patient after visit. Subjective:   Judie Butt was seen for:  Chief Complaint   Patient presents with    Hypertension    Cholesterol Problem    Asthma      Notes:  Generally stable. Pt asks re: Cologuard.   Pt suspects blood in the stool is the reason for positive result on Cologuard. Pt had a great deal of discomfort with prior virtual colonoscopy. No CP, SOB, neuro sx        Nursing screenings reviewed by provider at visit. Past medical, Social, and Family history reviewed  Medications reviewed and updated. Allergies   Allergen Reactions    Hydrochlorothiazide Other (comments)     hyponatremia       Prior to Admission medications    Medication Sig Start Date End Date Taking? Authorizing Provider   omeprazole (PRILOSEC) 20 mg capsule Take 1 Capsule by mouth daily. 5/9/22  Yes Mayra Swanson MD   sertraline (ZOLOFT) 50 mg tablet TAKE 1 TABLET DAILY FOR  ANXIETY WITH DEPRESSION 5/9/22  Yes Mayra Swanson MD   divalproex DR (DEPAKOTE) 250 mg tablet TAKE 1 TABLET TWICE A DAY  FOR BIPOLAR DISORDER IN    REMISSION 5/9/22  Yes Mayra Swanson MD   lisinopriL (PRINIVIL, ZESTRIL) 40 mg tablet Take 0.5 Tablets by mouth two (2) times a day. 4/25/22  Yes Mayra Swanson MD   amLODIPine (NORVASC) 10 mg tablet Take 1 Tablet by mouth daily. 4/25/22  Yes Mayra Swanson MD   rosuvastatin (CRESTOR) 20 mg tablet Take 1 Tablet by mouth nightly. 2/8/22  Yes Mayra Swanson MD   fluticasone propion-salmeteroL (ADVAIR/WIXELA) 250-50 mcg/dose diskus inhaler Take 1 Puff by inhalation two (2) times a day. 10/20/21  Yes Mayra Swanson MD   albuterol (ProAir HFA) 90 mcg/actuation inhaler Take 2 Puffs by inhalation every four (4) hours as needed for Wheezing. 9/27/21  Yes Mayra Swanson MD   prednisoLONE acetate (PRED FORTE) 1 % ophthalmic suspension Administer 1 Drop to left eye daily. Yes Provider, Historical   cholecalciferol (VITAMIN D3) 1,000 unit tablet Take 1 Tab by mouth daily. 11/13/18  Yes Mayra Swanson MD   tamsulosin (FLOMAX) 0.4 mg capsule Take 1 Cap by mouth daily. 5/10/17  Yes Mayra Swanson MD   acyclovir (ZOVIRAX) 200 mg capsule Take  by mouth every four (4) hours (while awake).    Yes Provider, Historical         ROS     A complete ROS was performed and negative except as noted in HPI     PHYSICAL EXAMINATION:    Vital Signs: There were no vitals taken for this visit. Patient-Reported Vitals 6/25/2022   Patient-Reported Weight 214   Patient-Reported Height -   Patient-Reported Pulse 80   Patient-Reported Temperature 98.2   Patient-Reported Systolic  926   Patient-Reported Diastolic 76         Constitutional: [x] Appears well-developed and well-nourished [x] No apparent distress      Mental status: [x] Alert and awake  [x] Oriented [x] Able to follow commands       Eyes:   EOM    [x]  Normal      Sclera  [x]  Normal              Discharge [x]  None visible       HENT: [x] Normocephalic, atraumatic    [x] Mouth/Throat: Mucous membranes are moist    External Ears [x] Normal      Neck: [x] No visualized mass     Pulmonary/Chest: [x] Respiratory effort normal   [x] No visualized signs of difficulty breathing or respiratory distress    Musculoskeletal:  [x] Normal range of motion of neck    Neurological:        [x] No Facial Asymmetry (Cranial nerve 7 motor function) (limited exam due to video visit)          [x] No gaze palsy     Skin:        [x] No significant exanthematous lesions or discoloration noted on facial skin             Psychiatric:       [x] Normal Affect       Other pertinent observable physical exam findings:  None. We discussed the expected course, resolution and complications of the diagnosis(es) in detail. Medication risks, benefits, costs, interactions, and alternatives were discussed as indicated. I advised him to contact the office if his condition worsens, changes or fails to improve as anticipated. He expressed understanding with the diagnosis(es) and plan. Kirsten Suarez is a 73 y.o. male who was evaluated by a video visit encounter for concerns as above.       Kirsten Suarez is being evaluated by a Virtual Visit (video visit) encounter to address concerns as mentioned above. A caregiver was present when appropriate. Due to this being a TeleHealth encounter (During QRA-18 public health emergency), evaluation of the following organ systems was limited: Vitals/Constitutional/EENT/Resp/CV/GI//MS/Neuro/Skin/Heme-Lymph-Imm. Pursuant to the emergency declaration under the [address] [place] [address], [address] [place] [address] and the [employer] Act, this Virtual Visit was conducted with patient's (and/or legal guardian's) consent, to reduce the patient's risk of exposure to COVID-19 and provide necessary medical care. The patient (and/or legal guardian) has also been advised to contact this office for worsening conditions or problems, and seek emergency medical treatment and/or call 911 if deemed necessary. Patient identification was verified at the start of the visit: YES. Services were provided through a video synchronous discussion virtually to substitute for in-person clinic visit. Patient was located at their individual home (or other location as per patient preference). Provider was located in medical office. An electronic signature was used to authenticate this note.   -- Malgorzata Brenner MD

## 2022-07-06 ENCOUNTER — LAB ONLY (OUTPATIENT)
Dept: INTERNAL MEDICINE CLINIC | Age: 67
End: 2022-07-06

## 2022-07-06 DIAGNOSIS — F31.30 BIPOLAR DISORDER, MOST RECENT EPISODE DEPRESSED (HCC): ICD-10-CM

## 2022-07-06 DIAGNOSIS — R73.02 IGT (IMPAIRED GLUCOSE TOLERANCE): ICD-10-CM

## 2022-07-06 DIAGNOSIS — I10 ESSENTIAL HYPERTENSION: ICD-10-CM

## 2022-07-06 DIAGNOSIS — E78.5 DYSLIPIDEMIA: ICD-10-CM

## 2022-07-06 LAB
ALBUMIN SERPL-MCNC: 3.5 G/DL (ref 3.5–5)
ALBUMIN/GLOB SERPL: 0.9 {RATIO} (ref 1.1–2.2)
ALP SERPL-CCNC: 86 U/L (ref 45–117)
ALT SERPL-CCNC: 9 U/L (ref 12–78)
ANION GAP SERPL CALC-SCNC: 6 MMOL/L (ref 5–15)
AST SERPL-CCNC: 5 U/L (ref 15–37)
BASOPHILS # BLD: 0.1 K/UL (ref 0–0.1)
BASOPHILS NFR BLD: 1 % (ref 0–1)
BILIRUB SERPL-MCNC: 0.3 MG/DL (ref 0.2–1)
BUN SERPL-MCNC: 15 MG/DL (ref 6–20)
BUN/CREAT SERPL: 16 (ref 12–20)
CALCIUM SERPL-MCNC: 9.6 MG/DL (ref 8.5–10.1)
CHLORIDE SERPL-SCNC: 102 MMOL/L (ref 97–108)
CHOLEST SERPL-MCNC: 109 MG/DL
CK SERPL-CCNC: 56 U/L (ref 39–308)
CO2 SERPL-SCNC: 28 MMOL/L (ref 21–32)
CREAT SERPL-MCNC: 0.92 MG/DL (ref 0.7–1.3)
DIFFERENTIAL METHOD BLD: ABNORMAL
EOSINOPHIL # BLD: 0.2 K/UL (ref 0–0.4)
EOSINOPHIL NFR BLD: 2 % (ref 0–7)
ERYTHROCYTE [DISTWIDTH] IN BLOOD BY AUTOMATED COUNT: 12.2 % (ref 11.5–14.5)
EST. AVERAGE GLUCOSE BLD GHB EST-MCNC: 114 MG/DL
GLOBULIN SER CALC-MCNC: 3.9 G/DL (ref 2–4)
GLUCOSE SERPL-MCNC: 108 MG/DL (ref 65–100)
HBA1C MFR BLD: 5.6 % (ref 4–5.6)
HCT VFR BLD AUTO: 42.8 % (ref 36.6–50.3)
HDLC SERPL-MCNC: 37 MG/DL
HDLC SERPL: 2.9 {RATIO} (ref 0–5)
HGB BLD-MCNC: 13.6 G/DL (ref 12.1–17)
IMM GRANULOCYTES # BLD AUTO: 0 K/UL (ref 0–0.04)
IMM GRANULOCYTES NFR BLD AUTO: 0 % (ref 0–0.5)
LDLC SERPL CALC-MCNC: 51 MG/DL (ref 0–100)
LYMPHOCYTES # BLD: 2.2 K/UL (ref 0.8–3.5)
LYMPHOCYTES NFR BLD: 20 % (ref 12–49)
MCH RBC QN AUTO: 31.7 PG (ref 26–34)
MCHC RBC AUTO-ENTMCNC: 31.8 G/DL (ref 30–36.5)
MCV RBC AUTO: 99.8 FL (ref 80–99)
MONOCYTES # BLD: 1 K/UL (ref 0–1)
MONOCYTES NFR BLD: 9 % (ref 5–13)
NEUTS SEG # BLD: 7.6 K/UL (ref 1.8–8)
NEUTS SEG NFR BLD: 68 % (ref 32–75)
NRBC # BLD: 0 K/UL (ref 0–0.01)
NRBC BLD-RTO: 0 PER 100 WBC
PLATELET # BLD AUTO: 246 K/UL (ref 150–400)
PMV BLD AUTO: 11 FL (ref 8.9–12.9)
POTASSIUM SERPL-SCNC: 4.7 MMOL/L (ref 3.5–5.1)
PROT SERPL-MCNC: 7.4 G/DL (ref 6.4–8.2)
RBC # BLD AUTO: 4.29 M/UL (ref 4.1–5.7)
SODIUM SERPL-SCNC: 136 MMOL/L (ref 136–145)
TRIGL SERPL-MCNC: 105 MG/DL (ref ?–150)
VALPROATE SERPL-MCNC: 59 UG/ML (ref 50–100)
VLDLC SERPL CALC-MCNC: 21 MG/DL
WBC # BLD AUTO: 11.1 K/UL (ref 4.1–11.1)

## 2022-08-07 NOTE — PROGRESS NOTES
Labs obtained by primary care provider - who is no longer available to follow-up   - reviewed today. All levels are stable and in a controlled/expected range. No immediate recommendations or changes in medical care. Please be sure to review in person once you have established with new primary care doctor.

## 2022-08-10 DIAGNOSIS — E78.5 DYSLIPIDEMIA: ICD-10-CM

## 2022-08-14 RX ORDER — ROSUVASTATIN CALCIUM 20 MG/1
20 TABLET, COATED ORAL
Qty: 90 TABLET | Refills: 1 | Status: SHIPPED | OUTPATIENT
Start: 2022-08-14

## 2022-09-22 ENCOUNTER — PATIENT MESSAGE (OUTPATIENT)
Dept: INTERNAL MEDICINE CLINIC | Age: 67
End: 2022-09-22

## 2022-09-22 DIAGNOSIS — F41.9 ANXIETY DISORDER, UNSPECIFIED TYPE: ICD-10-CM

## 2022-09-22 DIAGNOSIS — I10 ESSENTIAL HYPERTENSION WITH GOAL BLOOD PRESSURE LESS THAN 140/90: ICD-10-CM

## 2022-09-22 DIAGNOSIS — F31.30 BIPOLAR DISORDER, MOST RECENT EPISODE DEPRESSED (HCC): ICD-10-CM

## 2022-09-30 RX ORDER — ALBUTEROL SULFATE 90 UG/1
2 AEROSOL, METERED RESPIRATORY (INHALATION)
Qty: 3 EACH | Refills: 0 | Status: SHIPPED | OUTPATIENT
Start: 2022-09-30

## 2022-09-30 RX ORDER — AMLODIPINE BESYLATE 10 MG/1
10 TABLET ORAL DAILY
Qty: 90 TABLET | Refills: 0 | Status: SHIPPED | OUTPATIENT
Start: 2022-09-30

## 2022-09-30 RX ORDER — LISINOPRIL 40 MG/1
20 TABLET ORAL 2 TIMES DAILY
Qty: 90 TABLET | Refills: 0 | Status: SHIPPED | OUTPATIENT
Start: 2022-09-30

## 2022-09-30 RX ORDER — DIVALPROEX SODIUM 250 MG/1
TABLET, DELAYED RELEASE ORAL
Qty: 180 TABLET | Refills: 0 | Status: SHIPPED | OUTPATIENT
Start: 2022-09-30

## 2022-09-30 RX ORDER — FLUTICASONE PROPIONATE AND SALMETEROL 250; 50 UG/1; UG/1
1 POWDER RESPIRATORY (INHALATION) 2 TIMES DAILY
Qty: 3 EACH | Refills: 0 | Status: SHIPPED | OUTPATIENT
Start: 2022-09-30

## 2022-09-30 RX ORDER — SERTRALINE HYDROCHLORIDE 50 MG/1
TABLET, FILM COATED ORAL
Qty: 90 TABLET | Refills: 0 | Status: SHIPPED | OUTPATIENT
Start: 2022-09-30

## 2022-09-30 RX ORDER — OMEPRAZOLE 20 MG/1
20 CAPSULE, DELAYED RELEASE ORAL DAILY
Qty: 90 CAPSULE | Refills: 0 | Status: SHIPPED | OUTPATIENT
Start: 2022-09-30